# Patient Record
Sex: FEMALE | Race: BLACK OR AFRICAN AMERICAN | NOT HISPANIC OR LATINO
[De-identification: names, ages, dates, MRNs, and addresses within clinical notes are randomized per-mention and may not be internally consistent; named-entity substitution may affect disease eponyms.]

---

## 2019-10-24 ENCOUNTER — APPOINTMENT (OUTPATIENT)
Dept: ORTHOPEDIC SURGERY | Facility: CLINIC | Age: 54
End: 2019-10-24
Payer: COMMERCIAL

## 2019-10-24 VITALS — SYSTOLIC BLOOD PRESSURE: 170 MMHG | DIASTOLIC BLOOD PRESSURE: 89 MMHG | HEART RATE: 65 BPM

## 2019-10-24 VITALS — HEIGHT: 62 IN | BODY MASS INDEX: 32.02 KG/M2 | WEIGHT: 174 LBS

## 2019-10-24 DIAGNOSIS — M54.5 LOW BACK PAIN: ICD-10-CM

## 2019-10-24 DIAGNOSIS — M47.816 SPONDYLOSIS W/OUT MYELOPATHY OR RADICULOPATHY, LUMBAR REGION: ICD-10-CM

## 2019-10-24 DIAGNOSIS — M16.11 UNILATERAL PRIMARY OSTEOARTHRITIS, RIGHT HIP: ICD-10-CM

## 2019-10-24 DIAGNOSIS — S73.191A OTHER SPRAIN OF RIGHT HIP, INITIAL ENCOUNTER: ICD-10-CM

## 2019-10-24 DIAGNOSIS — M25.551 PAIN IN RIGHT HIP: ICD-10-CM

## 2019-10-24 PROCEDURE — 99204 OFFICE O/P NEW MOD 45 MIN: CPT

## 2019-10-24 PROCEDURE — 73502 X-RAY EXAM HIP UNI 2-3 VIEWS: CPT | Mod: RT

## 2019-10-24 PROCEDURE — 72100 X-RAY EXAM L-S SPINE 2/3 VWS: CPT

## 2019-10-24 RX ORDER — CELECOXIB 200 MG/1
200 CAPSULE ORAL TWICE DAILY
Qty: 60 | Refills: 1 | Status: ACTIVE | COMMUNITY
Start: 2019-10-24 | End: 1900-01-01

## 2019-11-01 NOTE — PHYSICAL EXAM
[Coxalgic] : coxalgic [LE] : Sensory: Intact in bilateral lower extremities [Knee] : patellar 2+ and symmetric bilaterally [Ankle] : ankle 2+ and symmetric bilaterally [DP] : dorsalis pedis 2+ and symmetric bilaterally [PT] : posterior tibial 2+ and symmetric bilaterally [de-identified] : On general examination the patient is adequately groomed and nourished. The vital parameters are as recorded. \par There is no lymphedema or diffuse swelling, no varicose veins, no skin warmth/erythema/scars/swelling, no ulcers and no palpable lymph nodes or masses in both lower extremities. Bilateral pedal pulses are well palpable.\par Upper Extremity:\par Both right and left upper extremities are unremarkable in terms of skin rash, lesions, pigmentation, redness, tenderness, swelling, joint instability, abnormal deformity or crepitus. The overall range of motion, sensation, motor tone and strength testing are normal.\par \par Hip Exam:\par The gait is right stiff hip coxalgic.\par The patient has equal leg lengths and no pelvic tilt. \par Gil/Calixto test is 7 inches on the right and 6 inches on the left. \par Active SLR is 40 degrees on the right and 60 degrees on the left. Both hips demonstrate no scars and the skin has no signs of inflammation or tenderness. \par Both Hips have a range of motion that is symmetrical in flexion and extension of:\par Hip flexion:             Right 100 degrees                Left 100 degrees\par Hip abduction:      Right 35 degrees                  Left 40 degrees\par Hip adduction:      Right 15 degrees                    Left 20 degrees\par Internal rotation:      Right 15 degrees                   Left 20 degrees\par External rotation:    Right 35 degrees                  Left 40 degrees\par There is no flexion contracture, deformity or instability. \par Labral impingement tests are negative.\par Right hip flexor, abductor and extensor power is grade 4+.\par Left hip flexor, abductor and extensor power is grade 5.\par \par Spine Exam:\par There is mild curvature of the spine with loss of normal lumbar lordosis. The skin is devoid of erythema, scars, pigmentation or rashes. There is mild lumbar spasm and tenderness especially at L4-L5 or L5-S1. \par The range of motion of the lumbar spine is limited by pain and spasm. Forward flexion is 80% normal, extension catch positive, lateral flexion and rotation 80% normal. There is no lumbar spine imbalance or instability detected.\par Bilateral passive SLR is right 40 degrees, left 40 degrees in supine and sitting positions. Lasegue's Test is negative.\par Neurology:\par The patient is alert and oriented in person, place and time. The mood is calm and affect is normal.\par Testing for coordination including Rhomberg's Test and Finger-Nose Test, sensation, motor tone and power and deep tendon reflexes in both lower extremities is normal.\par \par Knee Exam: \par The gait and station is normal\par Both knees have a valgus alignment of 5 degrees with no flexion contracture or deformity. Both knees demonstrate no scars and the skin has no warmth, erythema, swelling or tenderness. \par Both knees have a normal range of motion of 0 degrees to 130 degrees flexion. There are no signs of joint line tenderness or effusion. \par Maury's test is negative. Alban test is negative. \par Lachman's test, Anterior/Posterior Drawer test and Pivot Shift Tests are negative. There is no mediolateral laxity and no anteroposterior instability. \par Patella compression test is negative and patellofemoral tracking is normal with no lateral subluxation, apprehension or instability. \par Both knees quadriceps and hamstrings power is normal.\par \par  [de-identified] : The following radiographs were ordered and read by me during this patients visit. I reviewed each radiograph in detail with the patient and discussed the findings as highlighted below. \par AP and false profile views of the right hip and AP view of the pelvis confirm mild degenerative joint disease with osteophyte formation\par AP and lateral views of the lumbar spine reveal L5S1 DJD\par  \par MRI of right hip taken 9/4/19 reveals partial thickness tearing of right superior labrum, mild edema of the bilateral GT Burtsa

## 2019-11-01 NOTE — CONSULT LETTER
[Dear  ___] : Dear  [unfilled], [Consult Letter:] : I had the pleasure of evaluating your patient, [unfilled]. [Please see my note below.] : Please see my note below. [Consult Closing:] : Thank you very much for allowing me to participate in the care of this patient.  If you have any questions, please do not hesitate to contact me. [Sincerely,] : Sincerely, [DrFrederic  ___] : Dr. MARTINES [FreeTextEntry2] : MATHEW MAURICIO \preston PECK MD,KRISTAL ALMODOVAR  [FreeTextEntry3] : Terry Hobson MD\par \par ______________________________________________\par [facility]: Hip/Knee Arthroplasty\par [address], [facility] [address], [place] [address]\par (t) [phone]\par (f) [phone]

## 2019-11-01 NOTE — HISTORY OF PRESENT ILLNESS
[7] : an average pain level of 7/10 [de-identified] : Ms. JUAN LOVE is a 54 year old female presents with right hip pain, present for right, now worsening.  She localizes the pain to the groin and lateral aspect of the right hip.  The patient describes the pain as stabbing and achy, and states it is intermittent, based on activity. She states the pain is present when walking, climbing stairs, standing from a seated position, and deep flexion of the hip.  Patient had not had any Pt or injections. She takes OTC NSAIDs with only mild improvement. She admits to lower back pain, and denies numbness and tingling of the lower extremities. The patient admits to limitations in her  quality of life, and is present to discuss options for treatment. JTM.\par PMHx: Lupus, HTN. \par PSHx: Radiofrequency ablation for Aflutter in January 2019. \par Current Meds: Amlodipine

## 2019-11-01 NOTE — DISCUSSION/SUMMARY
[de-identified] : Right hip advanced degenerative joint disease, Right Labral Tear, Lumbar DJD \par The natural history and treatment of degenerative arthritis was discussed with the patient at length today. The spectrum of treatment including nonoperative modalities to surgical intervention was elucidated. Noninvasive and nonoperative treatment modalities include weight reduction, activity modification with low impact exercise,  as needed use of acetaminophen or anti-inflammatory medications if tolerated, glucosamine/chondroitin supplements, and physical therapy. Further treatments can include corticosteroid injection and the use of viscosupplementation with hyaluronic acid injections. Definitive surgical treatment can certainly include total joint arthroplasty also. The risks and benefits of each treatment options was discussed and all questions were answered.\par The patient was informed of the findings and recommended conservative management in the form of a home exercise program, activity modifications, stationary bicycling, swimming and weight loss program. A trial of Glucosamine and Chondroiten Sulphate was recommended.\par A prescription for a course of physical therapy was provided.\par A prescription for non-steroidal anti-inflammatory medication celebrex was provided and the risks, benefits and side effects were discussed.\par Follow-up appointment was recommended in 3 months.\par

## 2019-11-10 ENCOUNTER — FORM ENCOUNTER (OUTPATIENT)
Age: 54
End: 2019-11-10

## 2019-11-11 ENCOUNTER — APPOINTMENT (OUTPATIENT)
Dept: RADIOLOGY | Facility: CLINIC | Age: 54
End: 2019-11-11
Payer: COMMERCIAL

## 2019-11-11 ENCOUNTER — OUTPATIENT (OUTPATIENT)
Dept: OUTPATIENT SERVICES | Facility: HOSPITAL | Age: 54
LOS: 1 days | End: 2019-11-11
Payer: COMMERCIAL

## 2019-11-11 DIAGNOSIS — Z98.89 OTHER SPECIFIED POSTPROCEDURAL STATES: Chronic | ICD-10-CM

## 2019-11-11 DIAGNOSIS — S73.191A OTHER SPRAIN OF RIGHT HIP, INITIAL ENCOUNTER: ICD-10-CM

## 2019-11-11 DIAGNOSIS — M16.11 UNILATERAL PRIMARY OSTEOARTHRITIS, RIGHT HIP: ICD-10-CM

## 2019-11-11 PROCEDURE — 73525 CONTRAST X-RAY OF HIP: CPT | Mod: 26,RT

## 2019-11-11 PROCEDURE — 27093 INJECTION FOR HIP X-RAY: CPT | Mod: RT

## 2019-11-11 PROCEDURE — 27093 INJECTION FOR HIP X-RAY: CPT

## 2019-11-11 PROCEDURE — 73525 CONTRAST X-RAY OF HIP: CPT

## 2024-03-01 ENCOUNTER — INPATIENT (INPATIENT)
Facility: HOSPITAL | Age: 59
LOS: 2 days | Discharge: HOME | End: 2024-03-04
Attending: INTERNAL MEDICINE | Admitting: INTERNAL MEDICINE
Payer: COMMERCIAL

## 2024-03-01 VITALS
HEART RATE: 64 BPM | DIASTOLIC BLOOD PRESSURE: 80 MMHG | OXYGEN SATURATION: 100 % | RESPIRATION RATE: 18 BRPM | SYSTOLIC BLOOD PRESSURE: 137 MMHG | TEMPERATURE: 98 F

## 2024-03-01 DIAGNOSIS — Z98.89 OTHER SPECIFIED POSTPROCEDURAL STATES: Chronic | ICD-10-CM

## 2024-03-01 DIAGNOSIS — R07.9 CHEST PAIN, UNSPECIFIED: ICD-10-CM

## 2024-03-01 LAB
ALBUMIN SERPL ELPH-MCNC: 4.2 G/DL — SIGNIFICANT CHANGE UP (ref 3.3–5)
ALP SERPL-CCNC: 87 U/L — SIGNIFICANT CHANGE UP (ref 40–120)
ALT FLD-CCNC: 19 U/L — SIGNIFICANT CHANGE UP (ref 4–33)
ANION GAP SERPL CALC-SCNC: 11 MMOL/L — SIGNIFICANT CHANGE UP (ref 7–14)
AST SERPL-CCNC: 22 U/L — SIGNIFICANT CHANGE UP (ref 4–32)
BASOPHILS # BLD AUTO: 0.03 K/UL — SIGNIFICANT CHANGE UP (ref 0–0.2)
BASOPHILS NFR BLD AUTO: 0.6 % — SIGNIFICANT CHANGE UP (ref 0–2)
BILIRUB SERPL-MCNC: 0.4 MG/DL — SIGNIFICANT CHANGE UP (ref 0.2–1.2)
BUN SERPL-MCNC: 16 MG/DL — SIGNIFICANT CHANGE UP (ref 7–23)
CALCIUM SERPL-MCNC: 9.7 MG/DL — SIGNIFICANT CHANGE UP (ref 8.4–10.5)
CHLORIDE SERPL-SCNC: 105 MMOL/L — SIGNIFICANT CHANGE UP (ref 98–107)
CO2 SERPL-SCNC: 25 MMOL/L — SIGNIFICANT CHANGE UP (ref 22–31)
CREAT SERPL-MCNC: 0.92 MG/DL — SIGNIFICANT CHANGE UP (ref 0.5–1.3)
EGFR: 71 ML/MIN/1.73M2 — SIGNIFICANT CHANGE UP
EOSINOPHIL # BLD AUTO: 0.08 K/UL — SIGNIFICANT CHANGE UP (ref 0–0.5)
EOSINOPHIL NFR BLD AUTO: 1.7 % — SIGNIFICANT CHANGE UP (ref 0–6)
GLUCOSE SERPL-MCNC: 119 MG/DL — HIGH (ref 70–99)
HCT VFR BLD CALC: 39.4 % — SIGNIFICANT CHANGE UP (ref 34.5–45)
HGB BLD-MCNC: 13.1 G/DL — SIGNIFICANT CHANGE UP (ref 11.5–15.5)
IANC: 2.7 K/UL — SIGNIFICANT CHANGE UP (ref 1.8–7.4)
IMM GRANULOCYTES NFR BLD AUTO: 0.2 % — SIGNIFICANT CHANGE UP (ref 0–0.9)
LIDOCAIN IGE QN: 64 U/L — HIGH (ref 7–60)
LYMPHOCYTES # BLD AUTO: 1.31 K/UL — SIGNIFICANT CHANGE UP (ref 1–3.3)
LYMPHOCYTES # BLD AUTO: 28.1 % — SIGNIFICANT CHANGE UP (ref 13–44)
MCHC RBC-ENTMCNC: 29.9 PG — SIGNIFICANT CHANGE UP (ref 27–34)
MCHC RBC-ENTMCNC: 33.2 GM/DL — SIGNIFICANT CHANGE UP (ref 32–36)
MCV RBC AUTO: 90 FL — SIGNIFICANT CHANGE UP (ref 80–100)
MONOCYTES # BLD AUTO: 0.54 K/UL — SIGNIFICANT CHANGE UP (ref 0–0.9)
MONOCYTES NFR BLD AUTO: 11.6 % — SIGNIFICANT CHANGE UP (ref 2–14)
NEUTROPHILS # BLD AUTO: 2.7 K/UL — SIGNIFICANT CHANGE UP (ref 1.8–7.4)
NEUTROPHILS NFR BLD AUTO: 57.8 % — SIGNIFICANT CHANGE UP (ref 43–77)
NRBC # BLD: 0 /100 WBCS — SIGNIFICANT CHANGE UP (ref 0–0)
NRBC # FLD: 0 K/UL — SIGNIFICANT CHANGE UP (ref 0–0)
PLATELET # BLD AUTO: 284 K/UL — SIGNIFICANT CHANGE UP (ref 150–400)
POTASSIUM SERPL-MCNC: 3.8 MMOL/L — SIGNIFICANT CHANGE UP (ref 3.5–5.3)
POTASSIUM SERPL-SCNC: 3.8 MMOL/L — SIGNIFICANT CHANGE UP (ref 3.5–5.3)
PROT SERPL-MCNC: 7.9 G/DL — SIGNIFICANT CHANGE UP (ref 6–8.3)
RBC # BLD: 4.38 M/UL — SIGNIFICANT CHANGE UP (ref 3.8–5.2)
RBC # FLD: 13.9 % — SIGNIFICANT CHANGE UP (ref 10.3–14.5)
SODIUM SERPL-SCNC: 141 MMOL/L — SIGNIFICANT CHANGE UP (ref 135–145)
TROPONIN T, HIGH SENSITIVITY RESULT: 7 NG/L — SIGNIFICANT CHANGE UP
TROPONIN T, HIGH SENSITIVITY RESULT: 8 NG/L — SIGNIFICANT CHANGE UP
WBC # BLD: 4.67 K/UL — SIGNIFICANT CHANGE UP (ref 3.8–10.5)
WBC # FLD AUTO: 4.67 K/UL — SIGNIFICANT CHANGE UP (ref 3.8–10.5)

## 2024-03-01 PROCEDURE — 99285 EMERGENCY DEPT VISIT HI MDM: CPT

## 2024-03-01 PROCEDURE — 99223 1ST HOSP IP/OBS HIGH 75: CPT

## 2024-03-01 PROCEDURE — 71046 X-RAY EXAM CHEST 2 VIEWS: CPT | Mod: 26

## 2024-03-01 RX ORDER — FAMOTIDINE 10 MG/ML
20 INJECTION INTRAVENOUS ONCE
Refills: 0 | Status: COMPLETED | OUTPATIENT
Start: 2024-03-01 | End: 2024-03-01

## 2024-03-01 RX ADMIN — FAMOTIDINE 20 MILLIGRAM(S): 10 INJECTION INTRAVENOUS at 17:28

## 2024-03-01 RX ADMIN — Medication 30 MILLILITER(S): at 17:28

## 2024-03-01 NOTE — ED PROVIDER NOTE - CLINICAL SUMMARY MEDICAL DECISION MAKING FREE TEXT BOX
ekg, troponin, cxr screen for ACS. Due to pt's comorbities will most likely CDU for echo. pt's last stress test was normal last year.   Pt is HD stable, well appearing. Low concern for pneumonia vs. PE. Pt does not perc out due to age, however PE is low on differential list, will not d-dimer.

## 2024-03-01 NOTE — ED PROVIDER NOTE - CHIEF COMPLAINT
This was already sent.  I'm not sending 90 days.  She has to have lab.    The patient is a 58y Female complaining of chest pain.

## 2024-03-01 NOTE — ED PROVIDER NOTE - NSICDXPASTMEDICALHX_GEN_ALL_CORE_FT
PAST MEDICAL HISTORY:  Atrial flutter     Eczema     HTN (hypertension)     Lupus (systemic lupus erythematosus)

## 2024-03-01 NOTE — ED PROVIDER NOTE - OBJECTIVE STATEMENT
58-year-old female patient past medical history hypertension, A-fib (compliant on baby aspirin, ablation in 2018) complains of constant midsternal chest pain radiating underneath her left breast x 1.5 weeks.  Patient was doing regular duties, cleaning her house at onset of chest pain.  Reports associated shortness of breath when walking upstairs.  Denies history of GERD, no fevers, no chills, no bodyaches, no cough, no history of blood clots, no history of MI/stents, no nausea, no vomiting, no diarrhea, no recent heavy lifting, no rash, no headaches, no dizziness, no vision changes, no recent travel, no recent immbolization, no palliative care. Pt's last stress and echo last year; normal

## 2024-03-01 NOTE — ED PROVIDER NOTE - PROGRESS NOTE DETAILS
MD Cheema (PGY2): patient received in signout. Seen and examined at bedside. Currently feels overall improved, SOB and chest pain resolving. Pending repeat trop, plan for admission vs CDU.

## 2024-03-01 NOTE — ED ADULT NURSE NOTE - OBJECTIVE STATEMENT
Pt received to HW 22a   , awake and alert, A&OX4, ambulatory. C/o chest pain in the midsternal area and SOB on exertion for the past week. pt has a hx of a-fib has had an ablation , in the past for A-fib. cap refill<2 sec bilaterally. no swelling noted to the   Respirations even and unlabored. Denies current SOB, N/V, HA, dizziness, palpitations, blurry vision.. Bed in lowest position, call bell within reach. Safety maintained.

## 2024-03-01 NOTE — ED PROVIDER NOTE - PHYSICAL EXAMINATION
GENERAL: NAD  HEENT:  Atraumatic  CHEST/LUNG: Chest rise equal bilaterally, clear breath sounds b/l  HEART: Regular rate and rhythm  ABDOMEN: Soft, Nontender, Nondistended  EXTREMITIES:  Extremities warm  PSYCH: A&Ox3  SKIN: No obvious rashes or lesions  MSK: No cervical spine TTP, able to range neck to the left and right  NEUROLOGY: strength and sensation intact in all extremities.

## 2024-03-01 NOTE — ED PROVIDER NOTE - NSICDXPASTSURGICALHX_GEN_ALL_CORE_FT
PAST SURGICAL HISTORY:  History of salpingectomy left 2009    Status post laparoscopy 1996- endometriosis

## 2024-03-01 NOTE — ED ADULT NURSE NOTE - NSFALLUNIVINTERV_ED_ALL_ED
Bed/Stretcher in lowest position, wheels locked, appropriate side rails in place/Call bell, personal items and telephone in reach/Instruct patient to call for assistance before getting out of bed/chair/stretcher/Non-slip footwear applied when patient is off stretcher/[place] to call system/Physically safe environment - no spills, clutter or unnecessary equipment/Purposeful proactive rounding/Room/bathroom lighting operational, light cord in reach

## 2024-03-02 DIAGNOSIS — M54.9 DORSALGIA, UNSPECIFIED: ICD-10-CM

## 2024-03-02 DIAGNOSIS — I10 ESSENTIAL (PRIMARY) HYPERTENSION: ICD-10-CM

## 2024-03-02 DIAGNOSIS — Z79.899 OTHER LONG TERM (CURRENT) DRUG THERAPY: ICD-10-CM

## 2024-03-02 DIAGNOSIS — I20.0 UNSTABLE ANGINA: ICD-10-CM

## 2024-03-02 DIAGNOSIS — I48.20 CHRONIC ATRIAL FIBRILLATION, UNSPECIFIED: ICD-10-CM

## 2024-03-02 DIAGNOSIS — Z29.9 ENCOUNTER FOR PROPHYLACTIC MEASURES, UNSPECIFIED: ICD-10-CM

## 2024-03-02 LAB
A1C WITH ESTIMATED AVERAGE GLUCOSE RESULT: 5.7 % — HIGH (ref 4–5.6)
ALBUMIN SERPL ELPH-MCNC: 4.2 G/DL — SIGNIFICANT CHANGE UP (ref 3.3–5)
ALP SERPL-CCNC: 86 U/L — SIGNIFICANT CHANGE UP (ref 40–120)
ALT FLD-CCNC: 21 U/L — SIGNIFICANT CHANGE UP (ref 4–33)
ANION GAP SERPL CALC-SCNC: 13 MMOL/L — SIGNIFICANT CHANGE UP (ref 7–14)
AST SERPL-CCNC: 24 U/L — SIGNIFICANT CHANGE UP (ref 4–32)
BASOPHILS # BLD AUTO: 0.03 K/UL — SIGNIFICANT CHANGE UP (ref 0–0.2)
BASOPHILS NFR BLD AUTO: 0.7 % — SIGNIFICANT CHANGE UP (ref 0–2)
BILIRUB SERPL-MCNC: 0.8 MG/DL — SIGNIFICANT CHANGE UP (ref 0.2–1.2)
BUN SERPL-MCNC: 14 MG/DL — SIGNIFICANT CHANGE UP (ref 7–23)
CALCIUM SERPL-MCNC: 9.8 MG/DL — SIGNIFICANT CHANGE UP (ref 8.4–10.5)
CHLORIDE SERPL-SCNC: 105 MMOL/L — SIGNIFICANT CHANGE UP (ref 98–107)
CHOLEST SERPL-MCNC: 246 MG/DL — HIGH
CK MB BLD-MCNC: <1.6 % — SIGNIFICANT CHANGE UP (ref 0–2.5)
CK MB CFR SERPL CALC: <1 NG/ML — SIGNIFICANT CHANGE UP
CK SERPL-CCNC: 61 U/L — SIGNIFICANT CHANGE UP (ref 25–170)
CO2 SERPL-SCNC: 25 MMOL/L — SIGNIFICANT CHANGE UP (ref 22–31)
CREAT SERPL-MCNC: 1.06 MG/DL — SIGNIFICANT CHANGE UP (ref 0.5–1.3)
EGFR: 60 ML/MIN/1.73M2 — SIGNIFICANT CHANGE UP
EOSINOPHIL # BLD AUTO: 0.08 K/UL — SIGNIFICANT CHANGE UP (ref 0–0.5)
EOSINOPHIL NFR BLD AUTO: 1.8 % — SIGNIFICANT CHANGE UP (ref 0–6)
ESTIMATED AVERAGE GLUCOSE: 117 — SIGNIFICANT CHANGE UP
GLUCOSE SERPL-MCNC: 105 MG/DL — HIGH (ref 70–99)
HCT VFR BLD CALC: 40.8 % — SIGNIFICANT CHANGE UP (ref 34.5–45)
HDLC SERPL-MCNC: 79 MG/DL — SIGNIFICANT CHANGE UP
HGB BLD-MCNC: 13.5 G/DL — SIGNIFICANT CHANGE UP (ref 11.5–15.5)
IANC: 2.35 K/UL — SIGNIFICANT CHANGE UP (ref 1.8–7.4)
IMM GRANULOCYTES NFR BLD AUTO: 0.2 % — SIGNIFICANT CHANGE UP (ref 0–0.9)
LIPID PNL WITH DIRECT LDL SERPL: 154 MG/DL — HIGH
LYMPHOCYTES # BLD AUTO: 1.47 K/UL — SIGNIFICANT CHANGE UP (ref 1–3.3)
LYMPHOCYTES # BLD AUTO: 33.5 % — SIGNIFICANT CHANGE UP (ref 13–44)
MAGNESIUM SERPL-MCNC: 2.2 MG/DL — SIGNIFICANT CHANGE UP (ref 1.6–2.6)
MCHC RBC-ENTMCNC: 29.7 PG — SIGNIFICANT CHANGE UP (ref 27–34)
MCHC RBC-ENTMCNC: 33.1 GM/DL — SIGNIFICANT CHANGE UP (ref 32–36)
MCV RBC AUTO: 89.9 FL — SIGNIFICANT CHANGE UP (ref 80–100)
MONOCYTES # BLD AUTO: 0.45 K/UL — SIGNIFICANT CHANGE UP (ref 0–0.9)
MONOCYTES NFR BLD AUTO: 10.3 % — SIGNIFICANT CHANGE UP (ref 2–14)
NEUTROPHILS # BLD AUTO: 2.35 K/UL — SIGNIFICANT CHANGE UP (ref 1.8–7.4)
NEUTROPHILS NFR BLD AUTO: 53.5 % — SIGNIFICANT CHANGE UP (ref 43–77)
NON HDL CHOLESTEROL: 167 MG/DL — HIGH
NRBC # BLD: 0 /100 WBCS — SIGNIFICANT CHANGE UP (ref 0–0)
NRBC # FLD: 0 K/UL — SIGNIFICANT CHANGE UP (ref 0–0)
PHOSPHATE SERPL-MCNC: 2.9 MG/DL — SIGNIFICANT CHANGE UP (ref 2.5–4.5)
PLATELET # BLD AUTO: 294 K/UL — SIGNIFICANT CHANGE UP (ref 150–400)
POTASSIUM SERPL-MCNC: 4.2 MMOL/L — SIGNIFICANT CHANGE UP (ref 3.5–5.3)
POTASSIUM SERPL-SCNC: 4.2 MMOL/L — SIGNIFICANT CHANGE UP (ref 3.5–5.3)
PROT SERPL-MCNC: 8 G/DL — SIGNIFICANT CHANGE UP (ref 6–8.3)
RBC # BLD: 4.54 M/UL — SIGNIFICANT CHANGE UP (ref 3.8–5.2)
RBC # FLD: 14 % — SIGNIFICANT CHANGE UP (ref 10.3–14.5)
SODIUM SERPL-SCNC: 143 MMOL/L — SIGNIFICANT CHANGE UP (ref 135–145)
TRIGL SERPL-MCNC: 64 MG/DL — SIGNIFICANT CHANGE UP
TROPONIN T, HIGH SENSITIVITY RESULT: 7 NG/L — SIGNIFICANT CHANGE UP
WBC # BLD: 4.39 K/UL — SIGNIFICANT CHANGE UP (ref 3.8–10.5)
WBC # FLD AUTO: 4.39 K/UL — SIGNIFICANT CHANGE UP (ref 3.8–10.5)

## 2024-03-02 RX ORDER — CHLORTHALIDONE 50 MG
0 TABLET ORAL
Qty: 0 | Refills: 0 | DISCHARGE

## 2024-03-02 RX ORDER — ASPIRIN/CALCIUM CARB/MAGNESIUM 324 MG
81 TABLET ORAL DAILY
Refills: 0 | Status: DISCONTINUED | OUTPATIENT
Start: 2024-03-02 | End: 2024-03-04

## 2024-03-02 RX ORDER — ASPIRIN/CALCIUM CARB/MAGNESIUM 324 MG
1 TABLET ORAL
Refills: 0 | DISCHARGE

## 2024-03-02 RX ORDER — ACETAMINOPHEN 500 MG
650 TABLET ORAL EVERY 6 HOURS
Refills: 0 | Status: DISCONTINUED | OUTPATIENT
Start: 2024-03-02 | End: 2024-03-04

## 2024-03-02 RX ORDER — INFLUENZA VIRUS VACCINE 15; 15; 15; 15 UG/.5ML; UG/.5ML; UG/.5ML; UG/.5ML
0.5 SUSPENSION INTRAMUSCULAR ONCE
Refills: 0 | Status: DISCONTINUED | OUTPATIENT
Start: 2024-03-02 | End: 2024-03-04

## 2024-03-02 RX ORDER — LANOLIN ALCOHOL/MO/W.PET/CERES
3 CREAM (GRAM) TOPICAL AT BEDTIME
Refills: 0 | Status: DISCONTINUED | OUTPATIENT
Start: 2024-03-02 | End: 2024-03-04

## 2024-03-02 RX ORDER — ONDANSETRON 8 MG/1
4 TABLET, FILM COATED ORAL EVERY 8 HOURS
Refills: 0 | Status: DISCONTINUED | OUTPATIENT
Start: 2024-03-02 | End: 2024-03-04

## 2024-03-02 RX ORDER — METOPROLOL TARTRATE 50 MG
12.5 TABLET ORAL DAILY
Refills: 0 | Status: DISCONTINUED | OUTPATIENT
Start: 2024-03-02 | End: 2024-03-04

## 2024-03-02 RX ORDER — METOPROLOL TARTRATE 50 MG
0.5 TABLET ORAL
Refills: 0 | DISCHARGE

## 2024-03-02 RX ORDER — HEPARIN SODIUM 5000 [USP'U]/ML
5000 INJECTION INTRAVENOUS; SUBCUTANEOUS EVERY 12 HOURS
Refills: 0 | Status: DISCONTINUED | OUTPATIENT
Start: 2024-03-02 | End: 2024-03-04

## 2024-03-02 RX ADMIN — HEPARIN SODIUM 5000 UNIT(S): 5000 INJECTION INTRAVENOUS; SUBCUTANEOUS at 17:02

## 2024-03-02 RX ADMIN — Medication 81 MILLIGRAM(S): at 11:32

## 2024-03-02 RX ADMIN — HEPARIN SODIUM 5000 UNIT(S): 5000 INJECTION INTRAVENOUS; SUBCUTANEOUS at 05:28

## 2024-03-02 NOTE — ED ADULT NURSE REASSESSMENT NOTE - NS ED NURSE REASSESS COMMENT FT1
Break RN: Patient awake and resting in stretcher; respirations even and unlabored, no signs/symptoms of acute distress. Patient denies dyspnea, shortness of breath, and chest pain. Patient denies pain and offers no complaints at this time. 20G IV placed to left AC, labs collected and sent, medications administered per eMAR. Patient stable rhythm on tele. monitor. Safety measures in place, family at bedside.
As per break coverage report pt with c/o chest pain shortness of breath x1 week. received pepcid and maalox. Pt now stating I feel much better don't need anything else. "   Pt has Srg bed floor called at 12:50am " bed not ready nancy Wiggins to return call"
Pt received in hallway spot 22A in no acute distress. Denies SOB, headache dizziness, nausea, vomiting. States chest pain is better then when she first came. pending bed assignment.

## 2024-03-02 NOTE — H&P ADULT - PROBLEM SELECTOR PLAN 4
- patient stated that she is taking ASA, had refused to take DOAC because of "side effects" for which  she cannot tell me which  - c/w ASA for now  - EKG currently sinus bradycardia
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
## 2024-03-02 NOTE — ED ADULT NURSE REASSESSMENT NOTE - GENERAL PATIENT STATE
pt presently st "I feel better no shortness of breath. "/comfortable appearance/improvement verbalized
comfortable appearance

## 2024-03-02 NOTE — H&P ADULT - NSHPLABSRESULTS_GEN_ALL_CORE
13.1   4.67  )-----------( 284      ( 01 Mar 2024 17:29 )             39.4       03-01    141  |  105  |  16  ----------------------------<  119<H>  3.8   |  25  |  0.92    Ca    9.7      01 Mar 2024 17:29    TPro  7.9  /  Alb  4.2  /  TBili  0.4  /  DBili  x   /  AST  22  /  ALT  19  /  AlkPhos  87  03-01                    Urinalysis Basic - ( 01 Mar 2024 17:29 )    Color: x / Appearance: x / SG: x / pH: x  Gluc: 119 mg/dL / Ketone: x  / Bili: x / Urobili: x   Blood: x / Protein: x / Nitrite: x   Leuk Esterase: x / RBC: x / WBC x   Sq Epi: x / Non Sq Epi: x / Bacteria: x            CXR: As per my read - no opacities noted, Will wait for prelim/official read    EKG: As per my read - sinus bradycardia QTc 396 ms

## 2024-03-02 NOTE — PATIENT PROFILE ADULT - FALL HARM RISK - RISK INTERVENTIONS

## 2024-03-02 NOTE — H&P ADULT - PROBLEM SELECTOR PLAN 1
Assessment:  - the patient presented for new onset chest pain  - troponins neg x2, EKG sinus bradycardia no ST changes  - chest pain free at bedside    Plan:  - tele monitoring  - low suspicion for ACS at this time given negative troponins and normal EKG  - will repeat a third set of troponins in the AM  - echo pending  - cardiology consult in the AM  - patient instructed to notify RN and primary team if there are new onset chest pain  - hold BP meds for now Assessment:  - the patient presented for new onset chest pain  - troponins neg x2, EKG sinus bradycardia no ST changes  - chest pain free at bedside    Plan:  - tele monitoring  - low suspicion for ACS at this time given negative troponins and normal EKG  - will repeat a third set of troponins in the AM  - echo pending  - cardiology consult in the AM  - patient instructed to notify RN and primary team if there are new onset chest pain  - hold BP meds for now  - lipase 64, no epigastric pain, unlikely pancreatitis

## 2024-03-02 NOTE — H&P ADULT - NSHPPHYSICALEXAM_GEN_ALL_CORE
PHYSICAL EXAM:  VITALS: Vital Signs Last 24 Hrs  T(C): 36.9 (02 Mar 2024 02:40), Max: 36.9 (02 Mar 2024 02:40)  T(F): 98.4 (02 Mar 2024 02:40), Max: 98.4 (02 Mar 2024 02:40)  HR: 57 (02 Mar 2024 02:40) (52 - 67)  BP: 121/71 (02 Mar 2024 02:40) (121/71 - 137/80)  BP(mean): --  RR: 18 (02 Mar 2024 02:40) (18 - 18)  SpO2: 100% (02 Mar 2024 02:40) (100% - 100%)    Parameters below as of 02 Mar 2024 02:40  Patient On (Oxygen Delivery Method): room air      GENERAL: NAD, comfortable at bedside  HEAD:  Atraumatic, Normocephalic  EYES: EOMI, PERRL, conjunctiva and sclera clear  ENT: Moist Mucus Membranes present, no ulcers appreciated  NECK: Supple, No JVD  CHEST/LUNG: Clear to auscultation bilaterally; No wheezes, rales or rhonchi, no accessory muscle use  HEART: Regular rate and rhythm; No murmurs, rubs, or gallops, (+)S1, S2  ABDOMEN: Soft, Nontender, Nondistended; Normal Bowel sounds   EXTREMITIES: No clubbing, cyanosis, or edema, no acute tenderness on palpation of the lumbar spine on exam  PSYCH: normal mood and affect  NEUROLOGY: AAOx3, non-focal  SKIN: No rashes or lesions

## 2024-03-02 NOTE — H&P ADULT - NSHPREVIEWOFSYSTEMS_GEN_ALL_CORE
REVIEW OF SYSTEMS:    CONSTITUTIONAL: No weakness, fevers or chills  EYES/ENT: No visual changes;  No dysphagia; No sore throat; No rhinorrhea; No sinus pain/pressure  NECK: No pain or stiffness  RESPIRATORY: No cough, wheezing, hemoptysis; No shortness of breath  CARDIOVASCULAR: +chest pain, no palpitations; No lower extremity edema  GASTROINTESTINAL: No abdominal or epigastric pain. No nausea, vomiting, or hematemesis; No diarrhea or constipation. No melena or hematochezia.  GENITOURINARY: No dysuria, frequency or hematuria  NEUROLOGICAL: No numbness or weakness  MSK: ambulates without assistance, + mild back pain  SKIN: No itching, burning, rashes, or lesions   All other review of systems is negative unless indicated above.
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
## 2024-03-03 ENCOUNTER — RESULT REVIEW (OUTPATIENT)
Age: 59
End: 2024-03-03

## 2024-03-03 LAB
ANION GAP SERPL CALC-SCNC: 20 MMOL/L — HIGH (ref 7–14)
BASOPHILS # BLD AUTO: 0.04 K/UL — SIGNIFICANT CHANGE UP (ref 0–0.2)
BASOPHILS NFR BLD AUTO: 0.9 % — SIGNIFICANT CHANGE UP (ref 0–2)
BUN SERPL-MCNC: 15 MG/DL — SIGNIFICANT CHANGE UP (ref 7–23)
CALCIUM SERPL-MCNC: 9.9 MG/DL — SIGNIFICANT CHANGE UP (ref 8.4–10.5)
CHLORIDE SERPL-SCNC: 103 MMOL/L — SIGNIFICANT CHANGE UP (ref 98–107)
CO2 SERPL-SCNC: 17 MMOL/L — LOW (ref 22–31)
CREAT SERPL-MCNC: 1.08 MG/DL — SIGNIFICANT CHANGE UP (ref 0.5–1.3)
EGFR: 59 ML/MIN/1.73M2 — LOW
EOSINOPHIL # BLD AUTO: 0.09 K/UL — SIGNIFICANT CHANGE UP (ref 0–0.5)
EOSINOPHIL NFR BLD AUTO: 1.9 % — SIGNIFICANT CHANGE UP (ref 0–6)
GLUCOSE SERPL-MCNC: 91 MG/DL — SIGNIFICANT CHANGE UP (ref 70–99)
HCT VFR BLD CALC: 41 % — SIGNIFICANT CHANGE UP (ref 34.5–45)
HCV AB S/CO SERPL IA: 0.09 S/CO — SIGNIFICANT CHANGE UP (ref 0–0.99)
HCV AB SERPL-IMP: SIGNIFICANT CHANGE UP
HGB BLD-MCNC: 13.4 G/DL — SIGNIFICANT CHANGE UP (ref 11.5–15.5)
IANC: 1.76 K/UL — LOW (ref 1.8–7.4)
IMM GRANULOCYTES NFR BLD AUTO: 0.2 % — SIGNIFICANT CHANGE UP (ref 0–0.9)
LYMPHOCYTES # BLD AUTO: 2.27 K/UL — SIGNIFICANT CHANGE UP (ref 1–3.3)
LYMPHOCYTES # BLD AUTO: 48.5 % — HIGH (ref 13–44)
MCHC RBC-ENTMCNC: 29.3 PG — SIGNIFICANT CHANGE UP (ref 27–34)
MCHC RBC-ENTMCNC: 32.7 GM/DL — SIGNIFICANT CHANGE UP (ref 32–36)
MCV RBC AUTO: 89.7 FL — SIGNIFICANT CHANGE UP (ref 80–100)
MONOCYTES # BLD AUTO: 0.51 K/UL — SIGNIFICANT CHANGE UP (ref 0–0.9)
MONOCYTES NFR BLD AUTO: 10.9 % — SIGNIFICANT CHANGE UP (ref 2–14)
NEUTROPHILS # BLD AUTO: 1.76 K/UL — LOW (ref 1.8–7.4)
NEUTROPHILS NFR BLD AUTO: 37.6 % — LOW (ref 43–77)
NRBC # BLD: 0 /100 WBCS — SIGNIFICANT CHANGE UP (ref 0–0)
NRBC # FLD: 0 K/UL — SIGNIFICANT CHANGE UP (ref 0–0)
NT-PROBNP SERPL-SCNC: <36 PG/ML — SIGNIFICANT CHANGE UP
PLATELET # BLD AUTO: 288 K/UL — SIGNIFICANT CHANGE UP (ref 150–400)
POTASSIUM SERPL-MCNC: 4.6 MMOL/L — SIGNIFICANT CHANGE UP (ref 3.5–5.3)
POTASSIUM SERPL-SCNC: 4.6 MMOL/L — SIGNIFICANT CHANGE UP (ref 3.5–5.3)
RBC # BLD: 4.57 M/UL — SIGNIFICANT CHANGE UP (ref 3.8–5.2)
RBC # FLD: 13.9 % — SIGNIFICANT CHANGE UP (ref 10.3–14.5)
SODIUM SERPL-SCNC: 140 MMOL/L — SIGNIFICANT CHANGE UP (ref 135–145)
WBC # BLD: 4.68 K/UL — SIGNIFICANT CHANGE UP (ref 3.8–10.5)
WBC # FLD AUTO: 4.68 K/UL — SIGNIFICANT CHANGE UP (ref 3.8–10.5)

## 2024-03-03 PROCEDURE — 93016 CV STRESS TEST SUPVJ ONLY: CPT | Mod: GC

## 2024-03-03 PROCEDURE — 93018 CV STRESS TEST I&R ONLY: CPT | Mod: GC

## 2024-03-03 PROCEDURE — 93306 TTE W/DOPPLER COMPLETE: CPT | Mod: 26

## 2024-03-03 PROCEDURE — 78451 HT MUSCLE IMAGE SPECT SING: CPT | Mod: 26

## 2024-03-03 RX ORDER — ATORVASTATIN CALCIUM 80 MG/1
20 TABLET, FILM COATED ORAL AT BEDTIME
Refills: 0 | Status: DISCONTINUED | OUTPATIENT
Start: 2024-03-03 | End: 2024-03-04

## 2024-03-03 RX ADMIN — Medication 81 MILLIGRAM(S): at 11:28

## 2024-03-03 RX ADMIN — Medication 12.5 MILLIGRAM(S): at 05:27

## 2024-03-03 RX ADMIN — HEPARIN SODIUM 5000 UNIT(S): 5000 INJECTION INTRAVENOUS; SUBCUTANEOUS at 05:27

## 2024-03-03 RX ADMIN — HEPARIN SODIUM 5000 UNIT(S): 5000 INJECTION INTRAVENOUS; SUBCUTANEOUS at 17:23

## 2024-03-03 NOTE — PROGRESS NOTE ADULT - NSPROGADDITIONALINFOA_GEN_ALL_CORE
- stress test and TTE results reviewed. both unremarkable.  - dc planning per card.    --- Coverage for Dr. Vasquez ---   - Dr. CALL [facility]    - [phone]

## 2024-03-03 NOTE — PROGRESS NOTE ADULT - PROBLEM SELECTOR PLAN 1
Assessment:  - the patient presented for new onset chest pain  - troponins neg x2, EKG sinus bradycardia no ST changes  - chest pain free at bedside    Plan:  - tele monitoring  - low suspicion for ACS at this time given negative troponins and normal EKG  - will repeat a third set of troponins in the AM  - echo pending  - cardiology consult in the AM  - patient instructed to notify RN and primary team if there are new onset chest pain  - hold BP meds for now  - lipase 64, no epigastric pain, unlikely pancreatitis
Assessment:  - the patient presented for new onset chest pain  - troponins neg x2, EKG sinus bradycardia no ST changes  - chest pain free at bedside    Plan:  - tele monitoring  - low suspicion for ACS at this time given negative troponins and normal EKG  - lipase 64, no epigastric pain, unlikely pancreatitis  - stress test and TTE results reviewed. both unremarkable.  - card eval appreciated.
Assessment:  - the patient presented for new onset chest pain  - troponins neg x2, EKG sinus bradycardia no ST changes  - chest pain free at bedside    Plan:  - tele monitoring  - low suspicion for ACS at this time given negative troponins and normal EKG  - will repeat a third set of troponins in the AM  - echo pending  - cardiology consult in the AM  - patient instructed to notify RN and primary team if there are new onset chest pain  - hold BP meds for now  - lipase 64, no epigastric pain, unlikely pancreatitis

## 2024-03-03 NOTE — SWALLOW BEDSIDE ASSESSMENT ADULT - ASR SWALLOW RECOMMEND DIAG
Objective testing not warranted at this time given no overt signs of impaired airway protection and CXR indicating clear lungs

## 2024-03-03 NOTE — PROGRESS NOTE ADULT - PROBLEM SELECTOR PLAN 3
Patient:   RADHA CHOU            MRN: [id number]            FIN: [id number]              Age:   30 years     Sex:  MALE     :  86   Associated Diagnoses:   None   Author:   JO VALDEZ     Date of admission: 19  Date of discharge: 19  Dispo; home     Hospital course:     30 years old male with a history of long-standing alcohol dependence has been sober for about 4 months prior to relapsing to 3 weeks ago.  He headache and then started drinking and also developed bronchitis and he is feeling depressed which worsen his drinking.  Comes in yesterday feeling depressed suicidal with a BAL level of 352.  Usually does not have much withdrawal symptoms except for tremors.  No history of seizures.  He was admitted with  a sitter at the bedside.    Chronic alcohol dependence with acute alcohol intoxication  So far CIWA scores have been stable and he is not having any active withdrawal.  Mild anxiety and tremors with his scores around 3  He is not interested in inpatient rehabilitation program  He is already on naltrexone has been drinking on that  Also getting gabapentin at home.  His mood is a little bit better since there is no active withdrawal symptoms he is being discharged home.    Depression and SI  Seen by psychiatry and they DC'd sitter and advised to continue his on previous antipsychotic medications and follow-up with his regular psychiatrist.    Marijuana use disorder  Recommended to cut back    Bipolar disorder  Continue his home medications    Recent bronchitis  Finish course of prednisone.  Not wheezing.  Also recent sinusitis finished with Augmentin.    Headache-nonspecific  Improved with the Toradol.  Since it is going on for couple weeks ordered a CT head which is negative.    Current smoker-continue nicotine patch.    Medically stable for discharge.    Discharge diagnoses:     Acute alcohol intoxication  Chronic alcohol dependence  Major depression SI cleared by 
surgery  Recent bronchitis  Nonspecific headache    Vitals:   Vitals between:   28-JUL-2019 14:52:06   TO   29-JUL-2019 14:52:06                   LAST RESULT MINIMUM MAXIMUM  Temperature 36.5 36.2 36.9  Heart Rate 70 70 111  Respiratory Rate 15 14 18  NISBP           136 102 145  NIDBP           93 49 93  NIMBP           88 88 104  SpO2                    94 89 98    Patient is awake alert in no distress.  Head appears normocephalic  Eyes: Normal conjunctiva,  EOMs intact  Oral mucosa moist  Neck supple  Cardiovascular-S1-S2 present no murmurs  Pulmonary-Bilateral normal BS, no wheezing or rhonchi . Equal and non labored  Gastrointestinal-soft nontender, no organomegaly  Musculoskeletal-No edema. No calf tenderness  Neurological-intact. No focal deficit. Normal motor and sensory  Skin -no rash  Psych-normal affect    Labs:   Labs between:  28-JUL-2019 14:52 to 29-JUL-2019 14:52    CBC:                 WBC  HgB  Hct  Plt  MCV  RDW   29-JUL-2019 9.5  13.4  40.7  217  87.2  14.1   28-JUL-2019 (H) 13.7  15.1  45.2  310  85.9  13.9     DIFF:                 Seg  Neutroph//ABS  Lymph//ABS  Mono//ABS  EOS/ABS  29-JUL-2019 NOT APPLICABLE  54 // 5.2 36 // 3.4 7 // 0.7 1 // 0.1  28-JUL-2019 NOT APPLICABLE  53 // 7.3 38 // (H) 5.3  7 // 0.9 0 // 0.1    BMP:                 Na  Cl  BUN  Glu   29-JUL-2019 142  (H) 109  19  84                              K  CO2  Cr  Ca                              3.6  29  0.89  8.4   BMP:                 Na  Cl  BUN  Glu   28-JUL-2019 144  (H) 113  18  (H) 104                              K  CO2  Cr  Ca                              3.8  23  0.86  (L) 8.2     CMP:                 AST  ALT  AlkPhos  Bili  Albumin   28-JUL-2019 16  31  102  0.2  3.8     Other Chem:             Mg  Phos  Triglycerides  GGTP  DirectBili                           1.8                          Radiology results:  Result title:  CT HEAD OR BRAIN WO CON  Result status:  Final  Verified by:  ECHO, KAIA MALIK on 
07/29/2019 2:21  IMPRESSION:1.  Age-appropriate and unremarkable CT appearance of the brain.      Echocardiogram Results    No Results Have Been Found    Pending results: none    DISCHARGE MEDICATION LIST   Allergies: No known allergies     MEDICATION  DOSE  ROUTE  FREQUENCY  SPECIAL INSTRUCTIONS   albuterol (Ventolin HFA 90 mcg/inh inhalation aerosol)  2 puff  Inhaled  Every 6 hours As Needed: for wheezing    amoxicillin-clavulanate (amoxicillin-clavulanate oral 875-125 mg tablet)  1 tab  Oral  Every 12 hours  -finish the dose  buPROPion (buPROPion oral 300 mg/24 hours XL tablet)  300 mg=1 tab  Oral  Every 24 hours    busPIRone (busPIRone oral 10 mg tablet (BuSpar))  10 mg=1 tab  Oral  Three times daily    desvenlafaxine (desvenlafaxine 100 mg oral tablet, extended release)  100 mg=1 tab  Oral  Daily    fluticasone nasal (fluticasone nasal 50 mcg/spray)  2 spray  IntraNasal  Twice daily    gabapentin (gabapentin oral 300 mg capsule)  300 mg=1 cap  Oral  Three times daily    lurasidone (Latuda oral 40 mg tablet)  40 mg=1 tab  Oral  Nightly at bedtime    naltrexone (naltrexone oral 50 mg tablet (ReVia))  50 mg=1 tab  Oral  Nightly at bedtime    predniSONE (predniSONE oral 10 mg tablet)  40 mg=4 tab  Oral  Daily  -fisnish nory rest of the pills  traZODone (traZODone oral 100 mg tablet (Desyrel))  200 mg=2 tab  Oral  Nightly at bedtime         Primary Care Physician      Physician Name:  ALICIA VASQUES  Specialty :  INTERNAL MEDICINE    Consulting Physicians     Physician Name:  FELIX VELASCO Speciality:  PSYCHIATRY Consult Reason:  si, depression       Follow-up instructions:  Follow with the primary psychiatrist upon discharge and PCP in a week    I spent  35 minutes completing this patient's discharge.  
- hold valsartan pending cardiac work up  - c/w metoprolol for afib
- hold valsartan pending cardiac work up  - c/w metoprolol for afib
- can resume valsartan   - c/w metoprolol for afib

## 2024-03-03 NOTE — PROGRESS NOTE ADULT - TIME BILLING
- Review of records, telemetry, vital signs and daily labs.   - General and cardiovascular physical examination.  - Generation of cardiovascular treatment plan.  - Coordination of care.      Patient was seen and examined by me on 3/3/24,interim events noted,labs and radiology studies reviewed.  Kaden Moreno MD,FACC.  [address] [place], [place] [id number].  [phone]
- Review of records, telemetry, vital signs and daily labs.   - General and cardiovascular physical examination.  - Generation of cardiovascular treatment plan.  - Coordination of care.      Patient was seen and examined by me on 3/2/24,interim events noted,labs and radiology studies reviewed.  Kaden Moreno MD,FACC.  [address] [place], [place] [id number].  [phone]

## 2024-03-03 NOTE — PROGRESS NOTE ADULT - PROBLEM SELECTOR PLAN 2
- tylenol for pain prn  - no obvious functional deficits at this time from back pain

## 2024-03-03 NOTE — SWALLOW BEDSIDE ASSESSMENT ADULT - SWALLOW EVAL: DIAGNOSIS
1- Functional oral stage for regular solids with thin liquids marked by adequate oral containment, adequate bolus manipulation, adequate mastication, adequate anterior to posterior transfer, adequate oral clearance noted. 2- Functional pharyngeal stage for regular solids with thin liquids marked by initiation of pharyngeal swallow trigger and hyolaryngeal excursion upon palpation without evidence of impaired airway protection.

## 2024-03-03 NOTE — PROGRESS NOTE ADULT - PROBLEM SELECTOR PLAN 5
- Patient's OMR obtained from patient herself stated the list of medications
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
## 2024-03-04 ENCOUNTER — TRANSCRIPTION ENCOUNTER (OUTPATIENT)
Age: 59
End: 2024-03-04

## 2024-03-04 VITALS
RESPIRATION RATE: 18 BRPM | DIASTOLIC BLOOD PRESSURE: 59 MMHG | TEMPERATURE: 98 F | OXYGEN SATURATION: 99 % | SYSTOLIC BLOOD PRESSURE: 119 MMHG | HEART RATE: 62 BPM

## 2024-03-04 LAB
ANION GAP SERPL CALC-SCNC: 12 MMOL/L — SIGNIFICANT CHANGE UP (ref 7–14)
BASOPHILS # BLD AUTO: 0.03 K/UL — SIGNIFICANT CHANGE UP (ref 0–0.2)
BASOPHILS NFR BLD AUTO: 0.6 % — SIGNIFICANT CHANGE UP (ref 0–2)
BUN SERPL-MCNC: 16 MG/DL — SIGNIFICANT CHANGE UP (ref 7–23)
CALCIUM SERPL-MCNC: 9.8 MG/DL — SIGNIFICANT CHANGE UP (ref 8.4–10.5)
CHLORIDE SERPL-SCNC: 104 MMOL/L — SIGNIFICANT CHANGE UP (ref 98–107)
CO2 SERPL-SCNC: 25 MMOL/L — SIGNIFICANT CHANGE UP (ref 22–31)
CREAT SERPL-MCNC: 1.02 MG/DL — SIGNIFICANT CHANGE UP (ref 0.5–1.3)
EGFR: 63 ML/MIN/1.73M2 — SIGNIFICANT CHANGE UP
EOSINOPHIL # BLD AUTO: 0.15 K/UL — SIGNIFICANT CHANGE UP (ref 0–0.5)
EOSINOPHIL NFR BLD AUTO: 2.8 % — SIGNIFICANT CHANGE UP (ref 0–6)
GLUCOSE SERPL-MCNC: 102 MG/DL — HIGH (ref 70–99)
HCT VFR BLD CALC: 41.3 % — SIGNIFICANT CHANGE UP (ref 34.5–45)
HGB BLD-MCNC: 13.3 G/DL — SIGNIFICANT CHANGE UP (ref 11.5–15.5)
IANC: 2.12 K/UL — SIGNIFICANT CHANGE UP (ref 1.8–7.4)
IMM GRANULOCYTES NFR BLD AUTO: 0.2 % — SIGNIFICANT CHANGE UP (ref 0–0.9)
LYMPHOCYTES # BLD AUTO: 2.44 K/UL — SIGNIFICANT CHANGE UP (ref 1–3.3)
LYMPHOCYTES # BLD AUTO: 46.2 % — HIGH (ref 13–44)
MAGNESIUM SERPL-MCNC: 2.1 MG/DL — SIGNIFICANT CHANGE UP (ref 1.6–2.6)
MCHC RBC-ENTMCNC: 28.9 PG — SIGNIFICANT CHANGE UP (ref 27–34)
MCHC RBC-ENTMCNC: 32.2 GM/DL — SIGNIFICANT CHANGE UP (ref 32–36)
MCV RBC AUTO: 89.6 FL — SIGNIFICANT CHANGE UP (ref 80–100)
MONOCYTES # BLD AUTO: 0.53 K/UL — SIGNIFICANT CHANGE UP (ref 0–0.9)
MONOCYTES NFR BLD AUTO: 10 % — SIGNIFICANT CHANGE UP (ref 2–14)
NEUTROPHILS # BLD AUTO: 2.12 K/UL — SIGNIFICANT CHANGE UP (ref 1.8–7.4)
NEUTROPHILS NFR BLD AUTO: 40.2 % — LOW (ref 43–77)
NRBC # BLD: 0 /100 WBCS — SIGNIFICANT CHANGE UP (ref 0–0)
NRBC # FLD: 0 K/UL — SIGNIFICANT CHANGE UP (ref 0–0)
PHOSPHATE SERPL-MCNC: 3.6 MG/DL — SIGNIFICANT CHANGE UP (ref 2.5–4.5)
PLATELET # BLD AUTO: 294 K/UL — SIGNIFICANT CHANGE UP (ref 150–400)
POTASSIUM SERPL-MCNC: 4 MMOL/L — SIGNIFICANT CHANGE UP (ref 3.5–5.3)
POTASSIUM SERPL-SCNC: 4 MMOL/L — SIGNIFICANT CHANGE UP (ref 3.5–5.3)
RBC # BLD: 4.61 M/UL — SIGNIFICANT CHANGE UP (ref 3.8–5.2)
RBC # FLD: 14.1 % — SIGNIFICANT CHANGE UP (ref 10.3–14.5)
SODIUM SERPL-SCNC: 141 MMOL/L — SIGNIFICANT CHANGE UP (ref 135–145)
TSH SERPL-MCNC: 2.59 UIU/ML — SIGNIFICANT CHANGE UP (ref 0.27–4.2)
WBC # BLD: 5.28 K/UL — SIGNIFICANT CHANGE UP (ref 3.8–10.5)
WBC # FLD AUTO: 5.28 K/UL — SIGNIFICANT CHANGE UP (ref 3.8–10.5)

## 2024-03-04 RX ORDER — ROSUVASTATIN CALCIUM 5 MG/1
1 TABLET ORAL
Qty: 30 | Refills: 0
Start: 2024-03-04 | End: 2024-04-02

## 2024-03-04 RX ORDER — CHLORTHALIDONE 50 MG
0.5 TABLET ORAL
Refills: 0 | DISCHARGE

## 2024-03-04 RX ORDER — VALSARTAN 80 MG/1
1 TABLET ORAL
Qty: 30 | Refills: 0
Start: 2024-03-04 | End: 2024-04-02

## 2024-03-04 RX ORDER — CHLORTHALIDONE 50 MG
0.5 TABLET ORAL
Qty: 15 | Refills: 0 | DISCHARGE
Start: 2024-03-04 | End: 2024-04-02

## 2024-03-04 RX ORDER — ATORVASTATIN CALCIUM 80 MG/1
1 TABLET, FILM COATED ORAL
Qty: 30 | Refills: 0
Start: 2024-03-04 | End: 2024-04-02

## 2024-03-04 RX ORDER — VALSARTAN 80 MG/1
1 TABLET ORAL
Refills: 0 | DISCHARGE

## 2024-03-04 RX ORDER — CHLORTHALIDONE 50 MG
0.5 TABLET ORAL
Qty: 15 | Refills: 0
Start: 2024-03-04 | End: 2024-04-02

## 2024-03-04 RX ADMIN — Medication 81 MILLIGRAM(S): at 11:38

## 2024-03-04 RX ADMIN — Medication 12.5 MILLIGRAM(S): at 06:43

## 2024-03-04 RX ADMIN — HEPARIN SODIUM 5000 UNIT(S): 5000 INJECTION INTRAVENOUS; SUBCUTANEOUS at 06:44

## 2024-03-04 NOTE — DISCHARGE NOTE NURSING/CASE MANAGEMENT/SOCIAL WORK - PATIENT PORTAL LINK FT
You can access the FollowMyHealth Patient Portal offered by [facility] by registering at the following website: [email or web address]. By joining [facility]’s FollowMyHealth portal, you will also be able to view your health information using other applications (apps) compatible with our system.

## 2024-03-04 NOTE — DISCHARGE NOTE PROVIDER - NSDCMRMEDTOKEN_GEN_ALL_CORE_FT
aspirin 81 mg oral tablet: 1 tab(s) orally once a day  atorvastatin 20 mg oral tablet: 1 tab(s) orally once a day (at bedtime)  chlorthalidone 25 mg oral tablet: 0.5 tab(s) orally once a day as needed for  high blood pressure prn, hold if BP is &lt;100/60  metoprolol succinate 25 mg oral tablet, extended release: 0.5 tab(s) orally once a day  valsartan 320 mg oral tablet: 1 tab(s) orally once a day   aspirin 81 mg oral tablet: 1 tab(s) orally once a day  chlorthalidone 25 mg oral tablet: 0.5 tab(s) orally once a day as needed for  high blood pressure prn, hold if BP is &lt;100/60  metoprolol succinate 25 mg oral tablet, extended release: 0.5 tab(s) orally once a day  rosuvastatin 10 mg oral capsule: 1 cap(s) orally once a day  valsartan 320 mg oral tablet: 1 tab(s) orally once a day

## 2024-03-04 NOTE — DISCHARGE NOTE PROVIDER - NSDCCPCAREPLAN_GEN_ALL_CORE_FT
PRINCIPAL DISCHARGE DIAGNOSIS  Diagnosis: Chest pain  Assessment and Plan of Treatment: Cardiology saw you, echocardiogram and stress test were normal. chest pain could be related to acid reflux.   - TTE: EF 69%  - follow up with pcp/cardiologist

## 2024-03-04 NOTE — DISCHARGE NOTE NURSING/CASE MANAGEMENT/SOCIAL WORK - NSDCPEFALRISK_GEN_ALL_CORE
For information on Fall & Injury Prevention, visit: [email or web address] OR  [email or web address] OR  https://www.cdc.gov/steadi/patient.html
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
## 2024-03-04 NOTE — DISCHARGE NOTE PROVIDER - CARE PROVIDER_API CALL
Corby Fuentes  Internal Medicine  [address] [place] [address]  Phone: [phone]  Fax: [phone]  Follow Up Time:     Kaden Moreno  Cardiology  [address] [place] [address]  Phone: [phone]  Fax: [phone]  Follow Up Time:

## 2024-03-04 NOTE — DISCHARGE NOTE NURSING/CASE MANAGEMENT/SOCIAL WORK - NSDCVIVACCINE_GEN_ALL_CORE_FT
influenza, injectable, quadrivalent, preservative free; 20-Jan-2016 16:42; Louise Diaz (RN); [employer]; [id number]; IntraMuscular; Deltoid Left.; 0.5 milliLiter(s); VIS (VIS Published: 07-Aug-2015, VIS Presented: 20-Jan-2016);

## 2024-03-04 NOTE — DISCHARGE NOTE PROVIDER - NSDCCPTREATMENT_GEN_ALL_CORE_FT
PRINCIPAL PROCEDURE  Procedure: Transthoracic echocardiogram  Findings and Treatment: CONCLUSIONS:      1. Left ventricular cavity is normal in size. Left ventricular wall thickness is normal. Left ventricular systolic function is normal with an ejection fraction of 69 % by Martel's method of disks. There are no regional wall motion abnormalities seen.   2. Normal left ventricular diastolic function, with normal filling pressure.   3. No significant valvular disease.   4. There is calcification of the aortic valve leaflets.   5. There is calcification of the mitral valve annulus.   6. Normal right ventricular cavity size, with wall thickness, and normal systolic function. Tricuspid annular plane systolic excursion (TAPSE) is 2.7 cm (normal >=1.7 cm).   7. No pericardial effusion seen.   8. No prior echocardiogram is available for comparison.      SECONDARY PROCEDURE  Procedure: Nuclear medicine cardiopulmonary stress test  Findings and Treatment: 1. Normal myocardial perfusion scan, with no evidence of infarction or inducible ischemia.   2. The patient underwent stress testing using the standard Dariusz protocol.      _ The patient exercised for 5 min 1 sec.      _ The test was stopped due to fatigue.      _ The peak heart rate was 166 bpm; 104 % of predicted maximal heart rate for this patient.      _ The patient achieved 7 METS which is consistent with average exercise capacity.   3. Stress electrocardiogram: No ischemic ST segment changes.   4. The post stress left ventricular EF is 74 %. The stress end diastolic volume is 55 ml and systolic volume is 14 ml.

## 2024-06-01 NOTE — ED PROVIDER NOTE - ATTENDING CONTRIBUTION TO CARE
59 yo F hx HTN, atrial flutter s/p ablation in 2018, presenting with complaints of constant, non-radiating midsternal chest pain for one week. Pain is non-exertional, no associated nv or diaphoresis. Pain is worse at night, when supine. No reported change with food intake. She reports feeling palpitations and sob while going up the stairs. No reported orthopnea, leg pain/swelling, no pleuritic chest pain. No cough/fevers.    VITALS: reviewed  GEN: NAD, A & O x 4  HEAD/EYES: NCAT, EOMI, anicteric sclerae,   ENT: mucus membranes moist, oropharynx WNL, trachea midline,  RESP: lungs CTA with equal breath sounds bilaterally, chest wall nontender and atraumatic  CV: heart with reg rhythm S1, S2, distal pulses intact and symmetric bilaterally  ABDOMEN: normoactive bowel sounds, soft, nondistended, nontender, no palpable masses  : no CVAT  MSK: extremities atraumatic and nontender, no edema, no asymmetry.  SKIN: warm, dry, no rash, no bruising, no cyanosis. color appropriate for ethnicity  NEURO: alert, mentating appropriately, no facial asymmetry.   PSYCH: Affect appropriate    EKG with nonspecific depression aVL, no KORY. Sinus rhythm. Plan for labs, including troponin, cardiac monitor, EKG, and cdu vs admission for echo/stress. Statement Selected

## 2024-11-18 ENCOUNTER — APPOINTMENT (OUTPATIENT)
Dept: ELECTROPHYSIOLOGY | Facility: CLINIC | Age: 59
End: 2024-11-18
Payer: COMMERCIAL

## 2024-11-18 ENCOUNTER — NON-APPOINTMENT (OUTPATIENT)
Age: 59
End: 2024-11-18

## 2024-11-18 VITALS
DIASTOLIC BLOOD PRESSURE: 84 MMHG | WEIGHT: 178 LBS | BODY MASS INDEX: 32.76 KG/M2 | HEIGHT: 62 IN | SYSTOLIC BLOOD PRESSURE: 165 MMHG | HEART RATE: 51 BPM | OXYGEN SATURATION: 100 %

## 2024-11-18 VITALS — SYSTOLIC BLOOD PRESSURE: 153 MMHG | DIASTOLIC BLOOD PRESSURE: 65 MMHG

## 2024-11-18 DIAGNOSIS — R00.2 PALPITATIONS: ICD-10-CM

## 2024-11-18 DIAGNOSIS — I48.92 UNSPECIFIED ATRIAL FLUTTER: ICD-10-CM

## 2024-11-18 DIAGNOSIS — I10 ESSENTIAL (PRIMARY) HYPERTENSION: ICD-10-CM

## 2024-11-18 DIAGNOSIS — Z78.9 OTHER SPECIFIED HEALTH STATUS: ICD-10-CM

## 2024-11-18 DIAGNOSIS — I48.0 PAROXYSMAL ATRIAL FIBRILLATION: ICD-10-CM

## 2024-11-18 DIAGNOSIS — Z82.49 FAMILY HISTORY OF ISCHEMIC HEART DISEASE AND OTHER DISEASES OF THE CIRCULATORY SYSTEM: ICD-10-CM

## 2024-11-18 PROCEDURE — 99205 OFFICE O/P NEW HI 60 MIN: CPT | Mod: 25

## 2024-11-18 PROCEDURE — 93000 ELECTROCARDIOGRAM COMPLETE: CPT

## 2024-11-18 RX ORDER — APIXABAN 5 MG/1
5 TABLET, FILM COATED ORAL
Qty: 60 | Refills: 5 | Status: ACTIVE | COMMUNITY
Start: 2024-11-18

## 2024-11-18 RX ORDER — METOPROLOL SUCCINATE 25 MG/1
25 TABLET, EXTENDED RELEASE ORAL
Qty: 9 | Refills: 3 | Status: ACTIVE | COMMUNITY
Start: 2024-11-18

## 2024-11-18 RX ORDER — CHLORTHALIDONE 25 MG/1
25 TABLET ORAL
Qty: 30 | Refills: 1 | Status: ACTIVE | COMMUNITY
Start: 2024-11-18

## 2024-12-19 ENCOUNTER — OUTPATIENT (OUTPATIENT)
Dept: OUTPATIENT SERVICES | Facility: HOSPITAL | Age: 59
LOS: 1 days | End: 2024-12-19

## 2024-12-19 VITALS
TEMPERATURE: 97 F | SYSTOLIC BLOOD PRESSURE: 154 MMHG | OXYGEN SATURATION: 97 % | WEIGHT: 179.9 LBS | RESPIRATION RATE: 16 BRPM | DIASTOLIC BLOOD PRESSURE: 82 MMHG | HEIGHT: 62 IN | HEART RATE: 57 BPM

## 2024-12-19 DIAGNOSIS — Z98.89 OTHER SPECIFIED POSTPROCEDURAL STATES: Chronic | ICD-10-CM

## 2024-12-19 DIAGNOSIS — I48.0 PAROXYSMAL ATRIAL FIBRILLATION: ICD-10-CM

## 2024-12-19 DIAGNOSIS — Z98.890 OTHER SPECIFIED POSTPROCEDURAL STATES: Chronic | ICD-10-CM

## 2024-12-19 LAB
ANION GAP SERPL CALC-SCNC: 12 MMOL/L — SIGNIFICANT CHANGE UP (ref 7–14)
BASOPHILS # BLD AUTO: 0.03 K/UL — SIGNIFICANT CHANGE UP (ref 0–0.2)
BASOPHILS NFR BLD AUTO: 0.6 % — SIGNIFICANT CHANGE UP (ref 0–2)
BLD GP AB SCN SERPL QL: NEGATIVE — SIGNIFICANT CHANGE UP
BUN SERPL-MCNC: 17 MG/DL — SIGNIFICANT CHANGE UP (ref 7–23)
CALCIUM SERPL-MCNC: 9.8 MG/DL — SIGNIFICANT CHANGE UP (ref 8.4–10.5)
CHLORIDE SERPL-SCNC: 104 MMOL/L — SIGNIFICANT CHANGE UP (ref 98–107)
CO2 SERPL-SCNC: 27 MMOL/L — SIGNIFICANT CHANGE UP (ref 22–31)
CREAT SERPL-MCNC: 1.01 MG/DL — SIGNIFICANT CHANGE UP (ref 0.5–1.3)
EGFR: 63 ML/MIN/1.73M2 — SIGNIFICANT CHANGE UP
EOSINOPHIL # BLD AUTO: 0.07 K/UL — SIGNIFICANT CHANGE UP (ref 0–0.5)
EOSINOPHIL NFR BLD AUTO: 1.3 % — SIGNIFICANT CHANGE UP (ref 0–6)
GLUCOSE SERPL-MCNC: 108 MG/DL — HIGH (ref 70–99)
HCT VFR BLD CALC: 37.9 % — SIGNIFICANT CHANGE UP (ref 34.5–45)
HGB BLD-MCNC: 12.3 G/DL — SIGNIFICANT CHANGE UP (ref 11.5–15.5)
IMM GRANULOCYTES NFR BLD AUTO: 0.4 % — SIGNIFICANT CHANGE UP (ref 0–0.9)
LYMPHOCYTES # BLD AUTO: 1.28 K/UL — SIGNIFICANT CHANGE UP (ref 1–3.3)
LYMPHOCYTES # BLD AUTO: 24.3 % — SIGNIFICANT CHANGE UP (ref 13–44)
MCHC RBC-ENTMCNC: 29.3 PG — SIGNIFICANT CHANGE UP (ref 27–34)
MCHC RBC-ENTMCNC: 32.5 G/DL — SIGNIFICANT CHANGE UP (ref 32–36)
MCV RBC AUTO: 90.2 FL — SIGNIFICANT CHANGE UP (ref 80–100)
MONOCYTES # BLD AUTO: 0.38 K/UL — SIGNIFICANT CHANGE UP (ref 0–0.9)
MONOCYTES NFR BLD AUTO: 7.2 % — SIGNIFICANT CHANGE UP (ref 2–14)
NEUTROPHILS # BLD AUTO: 3.49 K/UL — SIGNIFICANT CHANGE UP (ref 1.8–7.4)
NEUTROPHILS NFR BLD AUTO: 66.2 % — SIGNIFICANT CHANGE UP (ref 43–77)
PLATELET # BLD AUTO: 267 K/UL — SIGNIFICANT CHANGE UP (ref 150–400)
POTASSIUM SERPL-MCNC: 4 MMOL/L — SIGNIFICANT CHANGE UP (ref 3.5–5.3)
POTASSIUM SERPL-SCNC: 4 MMOL/L — SIGNIFICANT CHANGE UP (ref 3.5–5.3)
RBC # BLD: 4.2 M/UL — SIGNIFICANT CHANGE UP (ref 3.8–5.2)
RBC # FLD: 14.4 % — SIGNIFICANT CHANGE UP (ref 10.3–14.5)
RH IG SCN BLD-IMP: POSITIVE — SIGNIFICANT CHANGE UP
RH IG SCN BLD-IMP: POSITIVE — SIGNIFICANT CHANGE UP
SODIUM SERPL-SCNC: 143 MMOL/L — SIGNIFICANT CHANGE UP (ref 135–145)
WBC # BLD: 5.27 K/UL — SIGNIFICANT CHANGE UP (ref 3.8–10.5)
WBC # FLD AUTO: 5.27 K/UL — SIGNIFICANT CHANGE UP (ref 3.8–10.5)

## 2024-12-19 NOTE — H&P PST ADULT - PROBLEM SELECTOR PLAN 1
preop for Afib ablation w/ biosarturo on 1/8/25  preop instructions given, pt verbalized understanding  chlorhexidine wash provided  PST cbc anemia workup, bmp, type and ABO pending  Pt instructed by Dr. Valle to hold all medications the morning of the ablation. She may take all regularly scheduled medications the night before.

## 2024-12-19 NOTE — H&P PST ADULT - ASSESSMENT
58 y/o female with HTN, HLD and hx of Afib/ Atrial flutter s/p ablation in 2016 presents to [facility] preop for Afib ablation w/ biosense. Pt experiences palpitations a few times a week. She saw her cardiologist and wore a holter monitor x 4 days which noted Afib/Aflutter. Pt was started on Eliquis 5mg BID.

## 2024-12-19 NOTE — H&P PST ADULT - NSICDXPASTMEDICALHX_GEN_ALL_CORE_FT
PAST MEDICAL HISTORY:  Atrial fibrillation     Atrial flutter     Degenerative joint disease (DJD) of lumbar spine     Eczema     HLD (hyperlipidemia)     HTN (hypertension)     Lupus (systemic lupus erythematosus)     Paroxysmal atrial fibrillation

## 2024-12-19 NOTE — H&P PST ADULT - NS PRO AD PATIENT TYPE
[address] Note    Date: 3/24/2021                                               Subjective/Objective:     Chief Complaint   Patient presents with    Discuss Labs    Anxiety     depression        HPI   Pt has concerns for anxiety and depression. Has had them her whole adult life. Hasn't been on meds much. Got worse recently over the last few months with stress from work and her children being home from school, as well as the pandemic. Pt is able to work as a nurse, and works very well, but when she gets home she overthinks things and feels like she paralyzed to take any actions. Isn't sleeping much but she rests around the house a lot just watching tv. Overall has a lack of motivation to follow through. Patient is also here for follow-up on labs. Labs showed a TSH of 9 with free T4 of 0.8. Patient does report fatigue recently. Denies constipation or cold intolerance. Patient also had an A1c of 6.7. No prior history of diabetes. Denies polyuria or polydipsia. There are no active problems to display for this patient. Past Medical History:   Diagnosis Date    ADHD (attention deficit hyperactivity disorder)     Allergic rhinitis     Depression     Hypothyroidism     LVH (left ventricular hypertrophy)        Current Outpatient Medications   Medication Sig Dispense Refill    sertraline (ZOLOFT) 25 MG tablet Take 1 tablet by mouth daily 30 tablet 3    levothyroxine (SYNTHROID) 50 MCG tablet Take 1 tablet by mouth daily 90 tablet 1    naproxen (NAPROSYN) 500 MG tablet Take 500 mg by mouth 2 times daily (with meals)      acetaminophen (TYLENOL) 500 MG tablet Take 500 mg by mouth every 6 hours as needed for Pain      terbinafine (LAMISIL) 250 MG tablet Take 1 tablet by mouth daily 84 tablet 1     No current facility-administered medications for this visit.         Allergies   Allergen Reactions    Bactrim [Sulfamethoxazole-Trimethoprim]     Morphine        Review of Health Care Proxy (HCP)

## 2024-12-19 NOTE — H&P PST ADULT - OPH GEN HX ROS MEA POS PC
wears corrective lenses, occasional double vision of left eye after bells palsy episode  in August/diplopia

## 2024-12-19 NOTE — H&P PST ADULT - LAST ECHOCARDIOGRAM
3/2024 TTE "left ventricular cavity is normal in size. Left ventricular wall thickness is normal. Left ventricular systolic function is normal with EF of 69 %,  normal left ventricular diastolic function, with normal filling pressure, right ventricular cavity is normal in size, with normal wall thickness and normal systolic function, aortic valve is tricuspid with normal leaflet excursion. There is calcification of the aortic valve leaflets. There is no aortic valve stenosis"

## 2024-12-19 NOTE — H&P PST ADULT - LAST STRESS TEST
nuclear stress test 3/2024 "Normal myocardial perfusion scan, with no evidence of infarction or inducible ischemia"

## 2024-12-19 NOTE — H&P PST ADULT - HISTORY OF PRESENT ILLNESS
60 y/o female with HTN, HLD and hx of Afib/ Atrial flutter s/p ablation in 2016 presents to [facility] preop for Afib ablation w/ biosense. Pt experiences palpitations a few times a week. She saw her cardiologist and wore a holter monitor x 4 days which noted Afib/Aflutter. Pt was started on Eliquis 5mg BID.

## 2024-12-19 NOTE — H&P PST ADULT - NSICDXFAMILYHX_GEN_ALL_CORE_FT
FAMILY HISTORY:  Mother  Still living? Unknown  FH: atrial fibrillation, Age at diagnosis: Age Unknown  FH: stroke, Age at diagnosis: Age Unknown

## 2024-12-19 NOTE — H&P PST ADULT - NSICDXPASTSURGICALHX_GEN_ALL_CORE_FT
PAST SURGICAL HISTORY:  H/O colonoscopy     S/P ablation of atrial fibrillation     S/P D&C (status post dilation and curettage)     Status post laparoscopy 1996- endometriosis

## 2024-12-19 NOTE — H&P PST ADULT - CARDIOVASCULAR COMMENTS
dx of paroxysmal atrial fibrillation hx of atrial fibrillation/ atrial flutter s/p ablation in 2016. preop dx of paroxysmal atrial fibrillation. see HPI

## 2025-01-07 NOTE — ASU PATIENT PROFILE, ADULT - NSICDXPASTMEDICALHX_GEN_ALL_CORE_FT
Statement Selected
PAST MEDICAL HISTORY:  Atrial fibrillation     Atrial flutter     Degenerative joint disease (DJD) of lumbar spine     Eczema     HLD (hyperlipidemia)     HTN (hypertension)     Lupus (systemic lupus erythematosus)     Paroxysmal atrial fibrillation     
loop recorder/yes(specify)

## 2025-01-07 NOTE — ASU PATIENT PROFILE, ADULT - FALL HARM RISK - UNIVERSAL INTERVENTIONS
Bed in lowest position, wheels locked, appropriate side rails in place/Call bell, personal items and telephone in reach/Instruct patient to call for assistance before getting out of bed or chair/Non-slip footwear when patient is out of bed/[place] to call system/Physically safe environment - no spills, clutter or unnecessary equipment/Purposeful Proactive Rounding/Room/bathroom lighting operational, light cord in reach

## 2025-01-07 NOTE — ASU PATIENT PROFILE, ADULT - NS PRO TALK SOMEONE YN
no
L knee ROM: 0-100 deg ext-flex/bilateral upper extremity Active ROM was WFL (within functional limits)/bilateral  lower extremity Active ROM was WFL (within functional limits)

## 2025-01-08 ENCOUNTER — INPATIENT (INPATIENT)
Facility: HOSPITAL | Age: 60
LOS: 0 days | Discharge: HOME | End: 2025-01-09
Attending: STUDENT IN AN ORGANIZED HEALTH CARE EDUCATION/TRAINING PROGRAM | Admitting: STUDENT IN AN ORGANIZED HEALTH CARE EDUCATION/TRAINING PROGRAM
Payer: COMMERCIAL

## 2025-01-08 VITALS
SYSTOLIC BLOOD PRESSURE: 186 MMHG | HEIGHT: 62 IN | WEIGHT: 179.02 LBS | OXYGEN SATURATION: 100 % | TEMPERATURE: 98 F | HEART RATE: 70 BPM | RESPIRATION RATE: 20 BRPM | DIASTOLIC BLOOD PRESSURE: 81 MMHG

## 2025-01-08 DIAGNOSIS — Z98.890 OTHER SPECIFIED POSTPROCEDURAL STATES: Chronic | ICD-10-CM

## 2025-01-08 DIAGNOSIS — I48.0 PAROXYSMAL ATRIAL FIBRILLATION: ICD-10-CM

## 2025-01-08 DIAGNOSIS — Z98.89 OTHER SPECIFIED POSTPROCEDURAL STATES: Chronic | ICD-10-CM

## 2025-01-08 PROCEDURE — 93655 ICAR CATH ABLTJ DSCRT ARRHYT: CPT | Mod: 59

## 2025-01-08 PROCEDURE — 93623 PRGRMD STIMJ&PACG IV RX NFS: CPT | Mod: 26

## 2025-01-08 PROCEDURE — 93656 COMPRE EP EVAL ABLTJ ATR FIB: CPT

## 2025-01-08 PROCEDURE — 93010 ELECTROCARDIOGRAM REPORT: CPT | Mod: 59

## 2025-01-08 RX ORDER — SODIUM CHLORIDE 9 MG/ML
3 INJECTION, SOLUTION INTRAMUSCULAR; INTRAVENOUS; SUBCUTANEOUS EVERY 8 HOURS
Refills: 0 | Status: DISCONTINUED | OUTPATIENT
Start: 2025-01-08 | End: 2025-01-09

## 2025-01-08 RX ORDER — METOCLOPRAMIDE 10 MG/1
5 TABLET ORAL ONCE
Refills: 0 | Status: COMPLETED | OUTPATIENT
Start: 2025-01-08 | End: 2025-01-08

## 2025-01-08 RX ORDER — FENTANYL 75 UG/H
25 PATCH, EXTENDED RELEASE TRANSDERMAL
Refills: 0 | Status: DISCONTINUED | OUTPATIENT
Start: 2025-01-08 | End: 2025-01-09

## 2025-01-08 RX ORDER — ONDANSETRON 4 MG/1
4 TABLET ORAL ONCE
Refills: 0 | Status: COMPLETED | OUTPATIENT
Start: 2025-01-08 | End: 2025-01-08

## 2025-01-08 RX ORDER — APIXABAN 5 MG/1
5 TABLET, FILM COATED ORAL
Refills: 0 | Status: DISCONTINUED | OUTPATIENT
Start: 2025-01-08 | End: 2025-01-09

## 2025-01-08 RX ORDER — METOPROLOL TARTRATE 50 MG
12.5 TABLET ORAL DAILY
Refills: 0 | Status: DISCONTINUED | OUTPATIENT
Start: 2025-01-08 | End: 2025-01-09

## 2025-01-08 RX ORDER — ROSUVASTATIN CALCIUM 5 MG/1
0.5 TABLET, FILM COATED ORAL
Refills: 0 | DISCHARGE

## 2025-01-08 RX ORDER — VALSARTAN 80 MG/1
320 TABLET ORAL DAILY
Refills: 0 | Status: DISCONTINUED | OUTPATIENT
Start: 2025-01-09 | End: 2025-01-09

## 2025-01-08 RX ORDER — ROSUVASTATIN 40 MG/1
2.5 TABLET, FILM COATED ORAL AT BEDTIME
Refills: 0 | Status: DISCONTINUED | OUTPATIENT
Start: 2025-01-08 | End: 2025-01-09

## 2025-01-08 RX ADMIN — APIXABAN 5 MILLIGRAM(S): 5 TABLET, FILM COATED ORAL at 21:43

## 2025-01-08 RX ADMIN — SODIUM CHLORIDE 3 MILLILITER(S): 9 INJECTION, SOLUTION INTRAMUSCULAR; INTRAVENOUS; SUBCUTANEOUS at 22:00

## 2025-01-08 RX ADMIN — ONDANSETRON 4 MILLIGRAM(S): 4 TABLET ORAL at 12:15

## 2025-01-08 RX ADMIN — ROSUVASTATIN 2.5 MILLIGRAM(S): 40 TABLET, FILM COATED ORAL at 21:43

## 2025-01-08 RX ADMIN — METOCLOPRAMIDE 5 MILLIGRAM(S): 10 TABLET ORAL at 16:39

## 2025-01-08 RX ADMIN — SODIUM CHLORIDE 3 MILLILITER(S): 9 INJECTION, SOLUTION INTRAMUSCULAR; INTRAVENOUS; SUBCUTANEOUS at 13:45
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
## 2025-01-08 NOTE — CHART NOTE - NSCHARTNOTEFT_GEN_A_CORE
Patient s/p cardiac cath, seen and examined at bedside, appears comfortable NAD noted, offers no complaints.  Rt groin appears clean, dry, intact, no evidence of active bleeding, no hematoma, + pulses. Continue to monitor.

## 2025-01-08 NOTE — ASU DISCHARGE PLAN (ADULT/PEDIATRIC) - COMMENTS
YOU CAN TAKE your ELIQUIS medication tonight at around 9:00pm when you get home. Monitor your right groin before and after taking the ELIQUIS for any bleeding/swelling or pain.

## 2025-01-08 NOTE — ASU DISCHARGE PLAN (ADULT/PEDIATRIC) - ASU DC SPECIAL INSTRUCTIONSFT
Patient s/p ablation   Discharge instructions reviewed with patient and written instructions given   Patient understands she will take off groin dressing 24 hours from procedure and then can shower with only water at the site; no soap, lotion, cream is to be applied to site ; patient will not put any dressing on the site once the initial dressing is removed  Site will not be submerged under water x 1 week including activities in the pool, bath or jacuzzi   Activity restrictions went over with patient including 1 week of refraining from strenuous activity including jogging, running, or driving   Any indication of infection including fever, chills, redness, swelling or discharge at the site - patient will call office [phone]  For serious symptoms like chest pain, SOB, lightheadedness, dizziness or syncope - patient was directed to the closest ER  All medications and diet reviewed   Follow date and time given 2/13 at 2:45PM  [address] [place] [address]   Oncology [facility] [address] [place] [address]   [phone]

## 2025-01-08 NOTE — ASU PREOP CHECKLIST - BOWEL PREP
Willie Morley    Session Notes:     Visit # 14/36         Date: 2/1/2021         Time in: 1000         Time out: 1100         Modality    SpO2 % HR RPE RPD   []   Warm-up      # Mins:         [x]  Arm Ergometer      Huff:    # Mins:  2 97 86 14 5   [x]  NuStep: Intervals  Warm-up:4/5min  Intervals:5:7 3:3  Cool-Down 4/ 5min     Workload  5-7  # Mins:  21 97 83 14 4   []  Recumbent Bike     Level: RPM: # Mins:         []  Bodyweight Exercises:     # Mins:           []  Resistance bands     Color   Reps/Sets # Mins:         []  Free Weights       LBS:     Reps/Sets   #Mins:         One-one-One Activity: Pt was the only one being treated by RT during this time     Start:   Stop: #Mins:    53       Balance Work:  Step-ups:    Reps/Sets     #Mins         Stretching  3 sets 10 96 82 12 3   Breathing Retraining   1000 10/2 10 97 92 12 1   Recovery & Monitoring   Jonas Castillo n/a

## 2025-01-08 NOTE — ASU DISCHARGE PLAN (ADULT/PEDIATRIC) - FINANCIAL ASSISTANCE
[facility] provides services at a reduced cost to those who are determined to be eligible through [facility]’s financial assistance program. Information regarding [facility]’s financial assistance program can be found by going to [email or web address] or by calling [phone].

## 2025-01-08 NOTE — PATIENT PROFILE ADULT - NSTOBACCONEVERSMOKERY/N_GEN_A
Care Management Initial Consult    General Information  Assessment completed with: Patient, pt  Type of CM/SW Visit: Initial Assessment    Primary Care Provider verified and updated as needed: Yes   Readmission within the last 30 days: no previous admission in last 30 days   Return Category: Progression of disease  Reason for Consult: discharge planning  Advance Care Planning: Advance Care Planning Reviewed: no concerns identified     General Information Comments: recently moved back to [place] in Dec 2021    Communication Assessment  Patient's communication style: spoken language (non-English) (Hmong)             Cognitive  Cognitive/Neuro/Behavioral: WDL                      Living Environment:   People in home: child(mago), adult     Current living Arrangements: house      Able to return to prior arrangements: yes       Family/Social Support:  Care provided by: self  Provides care for: no one  Marital Status:   ,Children          Description of Support System: Supportive,Involved    Support Assessment: Adequate family and caregiver support,Adequate social supports    Current Resources:   Patient receiving home care services: No     Community Resources: None  Equipment currently used at home: none  Supplies currently used at home: None    Employment/Financial:  Employment Status:          Financial Concerns: No concerns identified   Referral to Financial Counselor: No       Lifestyle & Psychosocial Needs:  Social Determinants of Health     Tobacco Use: Unknown     Smoking Tobacco Use: Never Smoker     Smokeless Tobacco Use: Unknown   Alcohol Use: Not on file   Financial Resource Strain: Not on file   Food Insecurity: Not on file   Transportation Needs: Not on file   Physical Activity: Not on file   Stress: Not on file   Social Connections: Not on file   Intimate Partner Violence: Not on file   Depression: Not on file   Housing Stability: Not on file       Functional Status:  Prior to admission patient  needed assistance:   Dependent ADLs:: Independent  Dependent IADLs:: Transportation  Assesssment of Functional Status: Not at baseline with ADL Functioning,Not at baseline with mobility    Mental Health Status:  Mental Health Status: No Current Concerns       Chemical Dependency Status:                Values/Beliefs:  Spiritual, Cultural Beliefs, Sikhism Practices, Values that affect care:                 Additional Information:  [facility] assessed, lives w/dtr Favio Lyndsay [phone], no needs at this time.  Pt recently moved back to [place] in Dec 2021, still getting settled.    Elder De Santiago RN         No

## 2025-01-08 NOTE — PATIENT PROFILE ADULT - FALL HARM RISK - RISK INTERVENTIONS

## 2025-01-09 ENCOUNTER — TRANSCRIPTION ENCOUNTER (OUTPATIENT)
Age: 60
End: 2025-01-09

## 2025-01-09 VITALS
SYSTOLIC BLOOD PRESSURE: 99 MMHG | DIASTOLIC BLOOD PRESSURE: 57 MMHG | OXYGEN SATURATION: 98 % | RESPIRATION RATE: 18 BRPM | TEMPERATURE: 98 F | HEART RATE: 65 BPM

## 2025-01-09 LAB
ANION GAP SERPL CALC-SCNC: 14 MMOL/L — SIGNIFICANT CHANGE UP (ref 7–14)
BUN SERPL-MCNC: 13 MG/DL — SIGNIFICANT CHANGE UP (ref 7–23)
CALCIUM SERPL-MCNC: 9 MG/DL — SIGNIFICANT CHANGE UP (ref 8.4–10.5)
CHLORIDE SERPL-SCNC: 105 MMOL/L — SIGNIFICANT CHANGE UP (ref 98–107)
CO2 SERPL-SCNC: 23 MMOL/L — SIGNIFICANT CHANGE UP (ref 22–31)
CREAT SERPL-MCNC: 0.88 MG/DL — SIGNIFICANT CHANGE UP (ref 0.5–1.3)
EGFR: 75 ML/MIN/1.73M2 — SIGNIFICANT CHANGE UP
GLUCOSE SERPL-MCNC: 119 MG/DL — HIGH (ref 70–99)
HCT VFR BLD CALC: 34.2 % — LOW (ref 34.5–45)
HGB BLD-MCNC: 11.2 G/DL — LOW (ref 11.5–15.5)
MAGNESIUM SERPL-MCNC: 2.1 MG/DL — SIGNIFICANT CHANGE UP (ref 1.6–2.6)
MCHC RBC-ENTMCNC: 28.9 PG — SIGNIFICANT CHANGE UP (ref 27–34)
MCHC RBC-ENTMCNC: 32.7 G/DL — SIGNIFICANT CHANGE UP (ref 32–36)
MCV RBC AUTO: 88.1 FL — SIGNIFICANT CHANGE UP (ref 80–100)
NRBC # BLD: 0 /100 WBCS — SIGNIFICANT CHANGE UP (ref 0–0)
NRBC # FLD: 0 K/UL — SIGNIFICANT CHANGE UP (ref 0–0)
PLATELET # BLD AUTO: 251 K/UL — SIGNIFICANT CHANGE UP (ref 150–400)
POTASSIUM SERPL-MCNC: 3.6 MMOL/L — SIGNIFICANT CHANGE UP (ref 3.5–5.3)
POTASSIUM SERPL-SCNC: 3.6 MMOL/L — SIGNIFICANT CHANGE UP (ref 3.5–5.3)
RBC # BLD: 3.88 M/UL — SIGNIFICANT CHANGE UP (ref 3.8–5.2)
RBC # FLD: 14.4 % — SIGNIFICANT CHANGE UP (ref 10.3–14.5)
SODIUM SERPL-SCNC: 142 MMOL/L — SIGNIFICANT CHANGE UP (ref 135–145)
WBC # BLD: 6.46 K/UL — SIGNIFICANT CHANGE UP (ref 3.8–10.5)
WBC # FLD AUTO: 6.46 K/UL — SIGNIFICANT CHANGE UP (ref 3.8–10.5)

## 2025-01-09 RX ORDER — APIXABAN 2.5 MG/1
1 TABLET, FILM COATED ORAL
Qty: 0 | Refills: 0 | DISCHARGE

## 2025-01-09 RX ORDER — ONDANSETRON 4 MG/1
4 TABLET ORAL ONCE
Refills: 0 | Status: COMPLETED | OUTPATIENT
Start: 2025-01-09 | End: 2025-01-09

## 2025-01-09 RX ORDER — APIXABAN 5 MG/1
1 TABLET, FILM COATED ORAL
Qty: 60 | Refills: 0
Start: 2025-01-09 | End: 2025-02-07

## 2025-01-09 RX ADMIN — VALSARTAN 320 MILLIGRAM(S): 80 TABLET ORAL at 06:05

## 2025-01-09 RX ADMIN — APIXABAN 5 MILLIGRAM(S): 5 TABLET, FILM COATED ORAL at 06:05

## 2025-01-09 RX ADMIN — ONDANSETRON 4 MILLIGRAM(S): 4 TABLET ORAL at 01:17

## 2025-01-09 RX ADMIN — Medication 12.5 MILLIGRAM(S): at 06:06

## 2025-01-09 NOTE — PHARMACOTHERAPY INTERVENTION NOTE - COMMENTS
Discharge medications reviewed with the patient. Current medication schedule was discussed in detail including: medication name, indication, dose, administration times, treatment duration, side effects, and special instructions. Educated on apixaban including the purpose and administration. Discussed adverse effects in detail and when to seek medical attention (blood in stool, vomit, etc.). Questions and concerns were answered and addressed. Patient demonstrated understanding.     Dominga Trujillo, PharmD, [facility]  Clinical Pharmacy Specialist  [phone]

## 2025-01-09 NOTE — DISCHARGE NOTE PROVIDER - CARE PROVIDER_API CALL
[facility]  Internal Medicine  [address] [place] [address]  Phone: [phone]  Fax: [phone]  Follow Up Time:

## 2025-01-09 NOTE — DISCHARGE NOTE NURSING/CASE MANAGEMENT/SOCIAL WORK - PATIENT PORTAL LINK FT
You can access the FollowMyHealth Patient Portal offered by [facility] by registering at the following website: [email or web address]. By joining [facility]’s FollowMyHealth portal, you will also be able to view your health information using other applications (apps) compatible with our system.

## 2025-01-09 NOTE — DISCHARGE NOTE NURSING/CASE MANAGEMENT/SOCIAL WORK - NSDCVIVACCINE_GEN_ALL_CORE_FT
influenza, injectable, quadrivalent, preservative free; 20-Jan-2016 16:42; Louise Diaz (RN); [employer]; [id number]; IntraMuscular; Deltoid Left.; 0.5 milliLiter(s); VIS (VIS Published: 07-Aug-2015, VIS Presented: 20-Jan-2016);

## 2025-01-09 NOTE — DISCHARGE NOTE PROVIDER - NSDCFUSCHEDAPPT_GEN_ALL_CORE_FT
[facility]  [facility]  ELECTROPH [phone]  Scheduled Appointment: 02/13/2025

## 2025-01-09 NOTE — DISCHARGE NOTE NURSING/CASE MANAGEMENT/SOCIAL WORK - NSDCPEFALRISK_GEN_ALL_CORE
For information on Fall & Injury Prevention, visit: [email or web address] OR  [email or web address] OR  https://www.cdc.gov/steadi/patient.html

## 2025-01-09 NOTE — DISCHARGE NOTE NURSING/CASE MANAGEMENT/SOCIAL WORK - FINANCIAL ASSISTANCE
[facility] provides services at a reduced cost to those who are determined to be eligible through [facility]’s financial assistance program. Information regarding [facility]’s financial assistance program can be found by going to [email or web address] or by calling [phone].
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
## 2025-01-09 NOTE — CHART NOTE - NSCHARTNOTEFT_GEN_A_CORE
pt. s/p Afib ablation yesterday  Right groin was covered with a  clear with a gauge and clear Tegaderm. It was clean, dry, and intact. No bleeding, drainage hematoma, ecchymosis, or bruit appreciated.    Pulses: +2DP/PT  No complains   Tele with Sinus rhythm   Continue AC Eliquis 5mg BID  Follow up with Dr. Valle as scheduled   pt. is cleared to discharge home

## 2025-01-09 NOTE — DISCHARGE NOTE PROVIDER - NSDCCPCAREPLAN_GEN_ALL_CORE_FT
PRINCIPAL DISCHARGE DIAGNOSIS  Diagnosis: Atrial fibrillation  Assessment and Plan of Treatment: You came into the hospital for an ablation for atrial fibrillation, you had an ablation done, please continue all current medications and follow up with the electrophysiologist      SECONDARY DISCHARGE DIAGNOSES  Diagnosis: HLD (hyperlipidemia)  Assessment and Plan of Treatment: Continue prescribed medications to control your cholesterol levels and a DASH (Low fat/salt) diet. Follow up with your primary care provider upon discharge for further management and monitoring of cholesterol levels.    Diagnosis: Hypertension  Assessment and Plan of Treatment: Continue blood pressure medication regimen as directed. Monitor for any visual changes, headaches or dizziness.  Monitor blood pressure regularly.  Follow up with your primary care provider for further management for high blood pressure.

## 2025-01-09 NOTE — DISCHARGE NOTE PROVIDER - NSDCMRMEDTOKEN_GEN_ALL_CORE_FT
chlorthalidone 25 mg oral tablet: 0.5 tab(s) orally 2 times a week  Crandberry with D-Mannose: 980mg Po five x weekly  Eliquis 5 mg oral tablet: 1 tab(s) orally 2 times a day YOU CAN TAKE your ELIQUIS medication tonight at around 9:00pm when you get home. Monitor your right groin before and after taking the ELIQUIS for any bleeding/swelling or pain.  Essential 1: 1 tablet Po daily  Friendly Ranjana: 10 billion CFU three times a week  metoprolol succinate 25 mg oral tablet, extended release: 0.5 tab(s) orally once a day  rosuvastatin 5 mg oral tablet: 0.5 tab(s) orally once a day  valsartan 320 mg oral tablet: 1 tab(s) orally once a day

## 2025-01-15 DIAGNOSIS — Z86.79 OTHER SPECIFIED POSTPROCEDURAL STATES: ICD-10-CM

## 2025-01-15 DIAGNOSIS — Z98.890 OTHER SPECIFIED POSTPROCEDURAL STATES: ICD-10-CM

## 2025-01-15 RX ORDER — VALSARTAN 320 MG/1
320 TABLET, COATED ORAL
Qty: 90 | Refills: 3 | Status: ACTIVE | COMMUNITY
Start: 2025-01-15

## 2025-02-13 ENCOUNTER — APPOINTMENT (OUTPATIENT)
Dept: ELECTROPHYSIOLOGY | Facility: CLINIC | Age: 60
End: 2025-02-13
Payer: COMMERCIAL

## 2025-02-13 ENCOUNTER — NON-APPOINTMENT (OUTPATIENT)
Age: 60
End: 2025-02-13

## 2025-02-13 VITALS
OXYGEN SATURATION: 98 % | BODY MASS INDEX: 33.13 KG/M2 | WEIGHT: 180 LBS | DIASTOLIC BLOOD PRESSURE: 79 MMHG | HEIGHT: 62 IN | HEART RATE: 65 BPM | TEMPERATURE: 98.6 F | SYSTOLIC BLOOD PRESSURE: 145 MMHG

## 2025-02-13 VITALS — DIASTOLIC BLOOD PRESSURE: 83 MMHG | SYSTOLIC BLOOD PRESSURE: 134 MMHG

## 2025-02-13 DIAGNOSIS — I10 ESSENTIAL (PRIMARY) HYPERTENSION: ICD-10-CM

## 2025-02-13 DIAGNOSIS — Z86.79 OTHER SPECIFIED POSTPROCEDURAL STATES: ICD-10-CM

## 2025-02-13 DIAGNOSIS — I48.0 PAROXYSMAL ATRIAL FIBRILLATION: ICD-10-CM

## 2025-02-13 DIAGNOSIS — Z98.890 OTHER SPECIFIED POSTPROCEDURAL STATES: ICD-10-CM

## 2025-02-13 PROCEDURE — 93000 ELECTROCARDIOGRAM COMPLETE: CPT

## 2025-02-13 PROCEDURE — 99213 OFFICE O/P EST LOW 20 MIN: CPT | Mod: 25

## 2025-05-15 ENCOUNTER — NON-APPOINTMENT (OUTPATIENT)
Age: 60
End: 2025-05-15

## 2025-05-15 ENCOUNTER — APPOINTMENT (OUTPATIENT)
Dept: ELECTROPHYSIOLOGY | Facility: CLINIC | Age: 60
End: 2025-05-15
Payer: COMMERCIAL

## 2025-05-15 VITALS
HEIGHT: 62 IN | BODY MASS INDEX: 34.6 KG/M2 | DIASTOLIC BLOOD PRESSURE: 81 MMHG | OXYGEN SATURATION: 98 % | SYSTOLIC BLOOD PRESSURE: 132 MMHG | WEIGHT: 188 LBS | HEART RATE: 59 BPM

## 2025-05-15 DIAGNOSIS — I48.0 PAROXYSMAL ATRIAL FIBRILLATION: ICD-10-CM

## 2025-05-15 DIAGNOSIS — Z86.79 OTHER SPECIFIED POSTPROCEDURAL STATES: ICD-10-CM

## 2025-05-15 DIAGNOSIS — Z98.890 OTHER SPECIFIED POSTPROCEDURAL STATES: ICD-10-CM

## 2025-05-15 DIAGNOSIS — I10 ESSENTIAL (PRIMARY) HYPERTENSION: ICD-10-CM

## 2025-05-15 PROCEDURE — 99214 OFFICE O/P EST MOD 30 MIN: CPT

## 2025-05-15 PROCEDURE — G2211 COMPLEX E/M VISIT ADD ON: CPT | Mod: NC

## 2025-05-15 PROCEDURE — 93000 ELECTROCARDIOGRAM COMPLETE: CPT

## 2025-05-19 ENCOUNTER — TRANSCRIPTION ENCOUNTER (OUTPATIENT)
Age: 60
End: 2025-05-19

## 2025-07-03 NOTE — H&P PST ADULT - NEGATIVE CARDIOVASCULAR SYMPTOMS
Supply Documentation (free text) no chest pain/no orthopnea/no paroxysmal nocturnal dyspnea/no peripheral edema/no claudication